# Patient Record
Sex: FEMALE | Race: WHITE | NOT HISPANIC OR LATINO | Employment: OTHER | URBAN - METROPOLITAN AREA
[De-identification: names, ages, dates, MRNs, and addresses within clinical notes are randomized per-mention and may not be internally consistent; named-entity substitution may affect disease eponyms.]

---

## 2017-01-03 ENCOUNTER — OFFICE VISIT (OUTPATIENT)
Dept: OTOLARYNGOLOGY | Facility: CLINIC | Age: 77
End: 2017-01-03
Payer: MEDICARE

## 2017-01-03 VITALS
BODY MASS INDEX: 22.55 KG/M2 | WEIGHT: 114.88 LBS | SYSTOLIC BLOOD PRESSURE: 153 MMHG | DIASTOLIC BLOOD PRESSURE: 78 MMHG | HEART RATE: 83 BPM | HEIGHT: 60 IN

## 2017-01-03 DIAGNOSIS — M26.609 TMJ (TEMPOROMANDIBULAR JOINT DISORDER): Primary | ICD-10-CM

## 2017-01-03 PROCEDURE — 99203 OFFICE O/P NEW LOW 30 MIN: CPT | Mod: S$GLB,,, | Performed by: OTOLARYNGOLOGY

## 2017-01-03 PROCEDURE — 99999 PR PBB SHADOW E&M-EST. PATIENT-LVL III: CPT | Mod: PBBFAC,,, | Performed by: OTOLARYNGOLOGY

## 2017-01-03 PROCEDURE — 3078F DIAST BP <80 MM HG: CPT | Mod: S$GLB,,, | Performed by: OTOLARYNGOLOGY

## 2017-01-03 PROCEDURE — 1125F AMNT PAIN NOTED PAIN PRSNT: CPT | Mod: S$GLB,,, | Performed by: OTOLARYNGOLOGY

## 2017-01-03 PROCEDURE — 1157F ADVNC CARE PLAN IN RCRD: CPT | Mod: S$GLB,,, | Performed by: OTOLARYNGOLOGY

## 2017-01-03 PROCEDURE — 3077F SYST BP >= 140 MM HG: CPT | Mod: S$GLB,,, | Performed by: OTOLARYNGOLOGY

## 2017-01-03 PROCEDURE — 1160F RVW MEDS BY RX/DR IN RCRD: CPT | Mod: S$GLB,,, | Performed by: OTOLARYNGOLOGY

## 2017-01-03 PROCEDURE — 1159F MED LIST DOCD IN RCRD: CPT | Mod: S$GLB,,, | Performed by: OTOLARYNGOLOGY

## 2017-01-03 NOTE — PROGRESS NOTES
Subjective:      Shira De Leon is a 76 y.o. female who presents as a referral for TMJ disorder evaluation. Patient has 2 year history of pain with chewing at angle of left mandible extending superiorly to TMJ. Pt reports no alleviating factors, but pain with eating is most common aggravator. Pt reports no history of recent dental work to left side of mouth, but does report 2 missing mandibular molars on left side. Pt has history significant for Bell's palsy on left side of face with residual mouth drooping on this side as well as h/o pituitary adenoma resection several years ago.      Past Medical History  She has a past medical history of Arthritis; GERD (gastroesophageal reflux disease); HTN (hypertension); Myelopathy of thoracic region; Osteoporosis, unspecified; Pituitary adenoma; and PVD (peripheral vascular disease).    Past Surgical History  She has a past surgical history that includes Cataract extraction; blephroplasty; yag; bladder lift; Total abdominal hysterectomy w/ bilateral salpingoophorectomy; pituitary adenoma resection (2013); Hysterectomy; and Colonoscopy (N/A, 9/15/2016).    Family History  Her family history includes No Known Problems in her brother, father, maternal aunt, maternal grandfather, maternal grandmother, maternal uncle, mother, paternal aunt, paternal grandfather, paternal grandmother, paternal uncle, and sister. There is no history of Amblyopia, Blindness, Hypertension, Macular degeneration, Retinal detachment, Strabismus, Stroke, Thyroid disease, Cancer, Cataracts, Diabetes, or Glaucoma.    Social History  She reports that she has never smoked. She does not have any smokeless tobacco history on file. She reports that she does not drink alcohol or use illicit drugs.    Allergies  She is allergic to penicillins.    Medications   She has a current medication list which includes the following prescription(s): amlodipine, calcium-vitamin d, hydrocodone-acetaminophen 10-325mg, and  pantoprazole.    Review of Systems  Review of Systems   Constitutional: Negative for chills and fever.   HENT: Positive for congestion, dental problem (Left TMJ pain) and tinnitus. Negative for hoarse voice and rhinorrhea.    Musculoskeletal: Positive for back pain.   Allergic/Immunologic: Positive for environmental allergies.   Neurological: Positive for headaches.          Objective:     Visit Vitals    BP (!) 153/78    Pulse 83    Ht 5' (1.524 m)    Wt 52.1 kg (114 lb 13.8 oz)    BMI 22.43 kg/m2          Constitutional:   She appears well-developed. She is cooperative. Normal speech.  No hoarse voice.      Head:  Normocephalic. Salivary glands normal.  Facial strength is normal.      Ears:    Right Ear: No drainage or tenderness. Tympanic membrane is not perforated. Tympanic membrane mobility is normal. No middle ear effusion. No decreased hearing is noted.   Left Ear: No drainage or tenderness. Tympanic membrane is not perforated. Tympanic membrane mobility is normal.  No middle ear effusion. No decreased hearing is noted.     Nose:  No mucosal edema, rhinorrhea, septal deviation or polyps. No epistaxis. Turbinates normal, no turbinate masses and no turbinate hypertrophy.  Right sinus exhibits no maxillary sinus tenderness and no frontal sinus tenderness. Left sinus exhibits no maxillary sinus tenderness and no frontal sinus tenderness.   Small right nostril notch.    Mouth/Throat  Oropharynx clear and moist without lesions or asymmetry and normal uvula midline. She does not have dentures. Normal dentition. No oral lesions or mucous membrane lesions. No oropharyngeal exudate or posterior oropharyngeal erythema. Mirror exam not performed due to patient tolerance.  Mirror exam not performed due to patient tolerance.    Healed sublabial incision.      Neck:  Neck normal without thyromegaly masses, asymmetry, normal tracheal structure, crepitus, and tenderness, thyroid normal, trachea normal and no adenopathy.  Normal range of motion present.     She has no cervical adenopathy.     Cardiovascular:   Regular rhythm.      Pulmonary/Chest:   Effort normal.     Psychiatric:   She has a normal mood and affect. Her speech is normal and behavior is normal.     Neurological:   No cranial nerve deficit.     Skin:   No rash noted.     Physical Exam    Vitals:    01/03/17 1444   BP: (!) 153/78   Pulse: 83     Body mass index is 22.43 kg/(m^2).    General: AOx3, NAD  Right Ear:  Canal WNL,TM w/o masses/lesions/perforations  Left Ear:   Canal WNL,TM w/o masses/lesions/perforations  Nose: No gross nasal septal deviation.  Synechiae present midway up nasal septum on right side. Inferior Turbinates WNL bilaterally.  No septal perforation.  No masses/lesions.  Oral Cavity: FOM Soft, no masses palpated.  Oral Tongue mobile.  Hard Palate WNL.  Oropharynx: BOT WNL.  No masses/lesions noted.  Tonsillar fossa without lesions.  Soft palate without masses.  Midline uvula.  Face: Corner of mouth on left side does not elevated (h/o of Bell's palsy). CN II-XII otherwise grossly intact. Pain upon palpation of left TMJ when bilateral TMJ's palpated and pt asked to open jaw  Eyes: Normal extra ocular motion bilaterally.    Data Reviewed    WBC (K/uL)   Date Value   09/21/2016 9.12     Eosinophil% (%)   Date Value   09/21/2016 1.9     Eos # (K/uL)   Date Value   09/21/2016 0.2     Platelets (K/uL)   Date Value   09/21/2016 428 (H)     Glucose (mg/dL)   Date Value   09/21/2016 94     No results found for: IGE       Assessment:     1. TMJ (temporomandibular joint disorder)         Plan:     I had a long discussion with the patient and her  regarding her condition and the further workup and management options.    - NSAID's for pain and inflammation relief  - Ice compress to left TMJ when symptomatic  - Jaw rest as possible with soft diet  - Urged discussion with dentist at next visit    Return if symptoms worsen or  fail to improve.

## 2017-01-03 NOTE — Clinical Note
January 3, 2017      Harry Holder MD  [address]  [address]  [facility] [address]           [facility] - Otorhinolaryngology  [address]  [place] [address]  Phone: [phone]  Fax: [phone]          Patient: Shira De Leon   MR Number: [id number]   YOB: 1940   Date of Visit: 1/3/2017       Dear Dr. Harry Holder:    Thank you for referring Shira De Leon to me for evaluation. Attached you will find relevant portions of my assessment and plan of care.    If you have questions, please do not hesitate to call me. I look forward to following Shira De Leon along with you.    Sincerely,    Donal Goodwin MD    Enclosure  CC:  No Recipients    If you would like to receive this communication electronically, please contact [email or web address] or [phone] to request more information on [employer] access.    For providers and/or their staff who would like to refer a patient to [facility], please contact us through our one-stop-shop provider referral line, [facility], at [phone].    If you feel you have received this communication in error or would no longer like to receive these types of communications, please e-mail [email or web address]

## 2017-01-03 NOTE — LETTER
January 3, 2017    Harry Holder MD  [address]  [address]  [place] [address]           OTOLARYNGOLOGY AND COMMUNICATION SCIENCES    Hansel Williamson MD, FACS          SURGICAL AND MEDICAL DISEASES OF HEAD AND NECK  MD Hansel Esparza MD, FACS  Armen Garcia III, MD    OTOLOGY, NEUROTOLOGY and SKULL-BASE SURGERY  Ja Wong MD, FACS  Dmitriy Odom MD, Director    PEDIATRIC OTOLARYNGOLOGY & OTOLOGY  YADI Cook MD, FAAP  Joe Pascal MD, FACS    FACIAL PLASTIC and RECONSTRUCTIVE SURGERY  GARY Wing III, MD, FACS    RHINOLOGY and SINUS SURGERY  Donal Goodwin MD, MPH, FACS  Director   GARY Wing III, MD, FACS    LARYNGOLOGY  Russ Meyer MD    SPEECH-LANGUAGE PATHOLOGY  Amy Brown, PhD, [facility]-SLP  Quita Segal, MS, CCC-SLP  Nickie Valiente, MS, CCC-SLP  Malika Lewis MA, [facility]-SLP    AUDIOLOGY SECTION  Anika Mcrae, MS, CCC-A  REEMA Pedraza, CCC-A  Ivan Hale, CCC-A  Ivan Hahn, CCC-A  Ajay Cruz Jr., REEMA, CCA-A  REEMA Wills, CCC-A  Ivan Taylor, CCC-A    [facility]ED [facility]ACT[facility] CLINIC[facility]  Head and Neck Surgical Oncology  MP Kaufman, FNP-C  Pedatric Otolaryngology  MP Burk, PNP-C       Re:  Shira De Leon  :  1940    Dear Dr. Holder,    Thank you for referring your patient, Shira De Leon, to us for evaluation and treatment.  I have enclosed a copy of my clinic note for your review and records.  If you have any questions please do not hesitate to contact our office.     Thank you for allowing me to participate in the care of your patient.    Sincerely,        Donal Goodwin MD, MPH, FACS    Director, Rhinology and Sinus Surgery  Department of Otorhinolaryngology  [facility]    Encl:  Progress note       [facility] [address] [place] [address]  phone [phone]  fax [phone]  [email or web address]

## 2017-03-29 ENCOUNTER — TELEPHONE (OUTPATIENT)
Dept: FAMILY MEDICINE | Facility: CLINIC | Age: 77
End: 2017-03-29

## 2017-03-29 NOTE — TELEPHONE ENCOUNTER
----- Message from Mala Frederick sent at 3/29/2017  8:16 AM CDT -----  Contact: [phone]  Pt spouse would like the doctor to call him back. Please advise.

## 2017-03-30 ENCOUNTER — TELEPHONE (OUTPATIENT)
Dept: FAMILY MEDICINE | Facility: CLINIC | Age: 77
End: 2017-03-30

## 2017-03-30 DIAGNOSIS — M79.2 NEUROPATHIC PAIN: ICD-10-CM

## 2017-03-30 DIAGNOSIS — M25.561 CHRONIC PAIN OF BOTH KNEES: Primary | ICD-10-CM

## 2017-03-30 DIAGNOSIS — G89.29 CHRONIC PAIN OF BOTH KNEES: Primary | ICD-10-CM

## 2017-03-30 DIAGNOSIS — M25.562 CHRONIC PAIN OF BOTH KNEES: Primary | ICD-10-CM

## 2017-03-30 DIAGNOSIS — M51.36 LUMBAR DEGENERATIVE DISC DISEASE: ICD-10-CM

## 2017-03-30 DIAGNOSIS — M54.16 LUMBAR RADICULOPATHY: ICD-10-CM

## 2017-03-30 RX ORDER — GABAPENTIN 100 MG/1
CAPSULE ORAL
Qty: 120 CAPSULE | Refills: 4 | Status: SHIPPED | OUTPATIENT
Start: 2017-03-30 | End: 2017-03-30 | Stop reason: SDUPTHER

## 2017-03-30 NOTE — TELEPHONE ENCOUNTER
----- Message from Leslie Lamb LPN sent at 3/30/2017  4:53 PM CDT -----  Contact: [phone]  Patient is having pain in knees and back.  Says he feels that her skin is 'burning'.  Wanted to know if you had a doctor you would recommend for her see?  ----- Message -----     From: Aneudy Franco LPN     Sent: 3/29/2017  10:43 AM       To: Leslie Lamb LPN        ----- Message -----     From: Mala Frederick     Sent: 3/29/2017   8:16 AM       To: [facility] Staff    Pt spouse would like the doctor to call him back. Please advise.    Please see message sent earlier for her to have bilateral knee x-rays, arrange for a lumbar MRI scan, and to begin gabapentin 100 mg in the morning, 100 mg in the afternoon, and 200 mg at bedtime for neuropathic pain.  I will also place a referral for her to be evaluated by interventional pain management, Dr.Reda Turpin.  Please arrange for the referral after the imaging test are done.  Thanks
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
## 2017-03-31 RX ORDER — GABAPENTIN 100 MG/1
CAPSULE ORAL
Qty: 360 CAPSULE | Refills: 4 | Status: SHIPPED | OUTPATIENT
Start: 2017-03-31 | End: 2017-04-04

## 2017-04-03 ENCOUNTER — TELEPHONE (OUTPATIENT)
Dept: FAMILY MEDICINE | Facility: CLINIC | Age: 77
End: 2017-04-03

## 2017-04-04 ENCOUNTER — OFFICE VISIT (OUTPATIENT)
Dept: FAMILY MEDICINE | Facility: CLINIC | Age: 77
End: 2017-04-04
Payer: MEDICARE

## 2017-04-04 VITALS
WEIGHT: 115.75 LBS | DIASTOLIC BLOOD PRESSURE: 82 MMHG | HEIGHT: 61 IN | HEART RATE: 78 BPM | BODY MASS INDEX: 21.85 KG/M2 | OXYGEN SATURATION: 98 % | SYSTOLIC BLOOD PRESSURE: 150 MMHG

## 2017-04-04 DIAGNOSIS — M17.0 BILATERAL PRIMARY OSTEOARTHRITIS OF KNEE: ICD-10-CM

## 2017-04-04 DIAGNOSIS — M51.9 LUMBAR DISC DISEASE: Primary | ICD-10-CM

## 2017-04-04 DIAGNOSIS — I10 ESSENTIAL HYPERTENSION: Chronic | ICD-10-CM

## 2017-04-04 PROCEDURE — 1157F ADVNC CARE PLAN IN RCRD: CPT | Mod: S$GLB,,, | Performed by: FAMILY MEDICINE

## 2017-04-04 PROCEDURE — 1125F AMNT PAIN NOTED PAIN PRSNT: CPT | Mod: S$GLB,,, | Performed by: FAMILY MEDICINE

## 2017-04-04 PROCEDURE — 99999 PR PBB SHADOW E&M-EST. PATIENT-LVL III: CPT | Mod: PBBFAC,,, | Performed by: FAMILY MEDICINE

## 2017-04-04 PROCEDURE — 1160F RVW MEDS BY RX/DR IN RCRD: CPT | Mod: S$GLB,,, | Performed by: FAMILY MEDICINE

## 2017-04-04 PROCEDURE — 3079F DIAST BP 80-89 MM HG: CPT | Mod: S$GLB,,, | Performed by: FAMILY MEDICINE

## 2017-04-04 PROCEDURE — 99214 OFFICE O/P EST MOD 30 MIN: CPT | Mod: S$GLB,,, | Performed by: FAMILY MEDICINE

## 2017-04-04 PROCEDURE — 3077F SYST BP >= 140 MM HG: CPT | Mod: S$GLB,,, | Performed by: FAMILY MEDICINE

## 2017-04-04 PROCEDURE — 1159F MED LIST DOCD IN RCRD: CPT | Mod: S$GLB,,, | Performed by: FAMILY MEDICINE

## 2017-04-04 RX ORDER — MELOXICAM 7.5 MG/1
7.5 TABLET ORAL DAILY
Qty: 30 TABLET | Refills: 0 | Status: SHIPPED | OUTPATIENT
Start: 2017-04-04 | End: 2017-04-04 | Stop reason: CLARIF

## 2017-04-04 NOTE — MR AVS SNAPSHOT
[address]  [place] [address]  Phone: [phone]  Fax: [phone]                  Shira De Leon   2017 11:20 AM   Office Visit    Descripción:  Female : 1940   Personal Médico:  Venkat Epstein MD   Departamento:  [facility]           Razón de la nohelia     Back Pain     Knee Pain           Diagnósticos de Esta Visita        Comentarios    Lumbar disc disease    -  Primario     Essential hypertension         Bilateral primary osteoarthritis of knee                Lista de tareas           Metas (5 Years of Data)     Ninguna      Follow-Up and Disposition     Return in about 4 weeks (around 2017), or if symptoms worsen or fail to improve.      Recetas para recoger        Disp Refills Start End    meloxicam (MOBIC) 7.5 MG tablet 30 tablet 0 2017     Take 1 tablet (7.5 mg total) by mouth once daily. - Oral    [facility]: [facility] - [address] [place]. de tlfo: #: [phone]         [facility] en [facility]lamada     [facility] En Llamada Línea de Enfermeras - Asistencia   Enfermeras registradas de [facility] pueden ayudarle a reservar barb nohelia, proveer educación para la jose l, asesoría clínica, y otros servicios de asesoramiento.   Llame para carlton servicio gratuito a [phone].             Medicamentos           Mensaje sobre Medicamentos     Verificar los cambios y / o adiciones a dalal régimen de medicación son los mismos que discutir con dalal médico. Si cualquiera de estos cambios o adiciones son incorrectos, por favor notifique a dalal proveedor de atención médica.        EMPEZAR a karen estos medicamentos NUEVOS        Refills    meloxicam (MOBIC) 7.5 MG tablet 0    Sig: Take 1 tablet (7.5 mg total) by mouth once daily.    Categoría: Normal    Vía: Oral      DEJAR de karen estos medicamentos     gabapentin (NEURONTIN) 100 MG capsule TAKE 1 CAPSULE BY MOUTH EVERY MORNING, 1 CAPSULE IN THE AFTERNOON AND 2  "CAPSULES EVERY NIGHT AT BEDTIME FOR BURNING PAIN    brompheniramine-pseudoeph-DM 2-30-10 mg/5 mL Syrp TK 1 TEA PO Q 6 H PRF CNC    hydrocodone-acetaminophen 10-325mg (NORCO)  mg Tab 1 tablet daily as needed.            Verifique que la siguiente lista de medicamentos es barb representación exacta de los medicamentos que está tomando actualmente. Si no hay ningunos reportados, la lista puede estar en crowley. Si no es correcta, por favor póngase en contacto con dalal proveedor de atención médica. Lleve esta lista con usted en alysha de emergencia.           Medicamentos Actuales     amlodipine (NORVASC) 5 MG tablet TAKE 1 TABLET EVERY DAY    calcium-vitamin D 500-125 mg-unit tablet Take 1 tablet by mouth 2 (two) times daily.    ciprofloxacin HCl (CIPRO) 500 MG tablet TK 1 T PO  BID    pantoprazole (PROTONIX) 40 MG tablet TAKE 1 TABLET(40 MG) BY MOUTH EVERY DAY    meloxicam (MOBIC) 7.5 MG tablet Take 1 tablet (7.5 mg total) by mouth once daily.           Información de referencia clínica           Chiqui signos vitales aleksandra     PS Pulso [place] Peso SpO2 BMI (IMC)    150/82 78 5' 1" (1.549 m) 52.5 kg (115 lb 11.9 oz) 98% 21.87 kg/m2      Blood Pressure          Most Recent Value    BP  (!)  150/82      Alergias     A partir del:  4/4/2017        Penicillins      Vacunas     Administradas en la fecha de la visita:  4/4/2017        None      Registrarse para [facility]     La activación de dalal cuenta [facility] es tan fácil dayna 1-2-3!    1) Ir a [email or web address], seleccione Registrarse Ahora, meter el código de activación y dalal fecha de nacimiento, y seleccione Próximo.    [id number]  Expires: 5/19/2017 11:42 AM      2) Crear un nombre de usuario y contraseña para usar cuando se visita [facility] en el futuro y selecciona barb pregunta de seguridad en alysha de que pierda dalal contraseña y seleccione Próximo.    3) Introduzca dalal dirección de correo electrónico y jenn[facility] en Registrarse!    Información Adicional  Si tiene alguna " pregunta, por favor, e-mail [email or web address] o lldante al [phone] para hablar con nuestro personal. Recuerde, [facility] no debe ser usada para necesidades urgentes. En alysha de emergencia médica, satya al 911.        Language Assistance Services     ATTENTION: Language assistance services are available, free of charge. Please call [phone].      ATENCIÓN: Si habla español, tiene a dalal disposición servicios gratuitos de asistencia lingüística. Llame al [phone].     CHÚ Ý: N?u b?n nói Ti?ng Vi?t, có các d?ch v? h? tr? ngôn ng? mi?n phí dành cho b?n. G?i s? [phone].         [facility] Medicine cumple con las leyes federales aplicables de derechos civiles y no discrimina por motivos de alfredo, color, origen nacional, edad, discapacidad, o sexo.                 Shira De Leon   2017 11:20 AM   Office Visit    Description:  Female : 1940   Provider:  Venkat Epstein MD   Department:  [facility] Medicine           Reason for Visit     Back Pain     Knee Pain           Diagnoses this Visit        Comments    Lumbar disc disease    -  Primary     Essential hypertension         Bilateral primary osteoarthritis of knee                To Do List           Goals     None      Follow-Up and Disposition     Return in about 4 weeks (around 2017), or if symptoms worsen or fail to improve.       These Medications        Disp Refills Start End    meloxicam (MOBIC) 7.5 MG tablet 30 tablet 0 2017     Take 1 tablet (7.5 mg total) by mouth once daily. - Oral    Pharmacy: [facility] - [place] [id number] [facility] Ph #: [phone]         [facility]     [facility] Nurse Care Line -  Assistance  Unless otherwise directed by your provider, please contact [facility], our nurse care line that is available for  assistance.     Registered nurses in the [facility] provide: appointment scheduling, clinical advisement, health  "education, and other advisory services.  Call: [phone] (toll free)               Medications           Message regarding Medications     Verify the changes and/or additions to your medication regime listed below are the same as discussed with your clinician today.  If any of these changes or additions are incorrect, please notify your healthcare provider.        START taking these NEW medications        Refills    meloxicam (MOBIC) 7.5 MG tablet 0    Sig: Take 1 tablet (7.5 mg total) by mouth once daily.    Class: Normal    Route: Oral      STOP taking these medications     gabapentin (NEURONTIN) 100 MG capsule TAKE 1 CAPSULE BY MOUTH EVERY MORNING, 1 CAPSULE IN THE AFTERNOON AND 2 CAPSULES EVERY NIGHT AT BEDTIME FOR BURNING PAIN    brompheniramine-pseudoeph-DM 2-30-10 mg/5 mL Syrp TK 1 TEA PO Q 6 H PRF CNC    hydrocodone-acetaminophen 10-325mg (NORCO)  mg Tab 1 tablet daily as needed.            Verify that the below list of medications is an accurate representation of the medications you are currently taking.  If none reported, the list may be blank. If incorrect, please contact your healthcare provider. Carry this list with you in case of emergency.           Current Medications     amlodipine (NORVASC) 5 MG tablet TAKE 1 TABLET EVERY DAY    calcium-vitamin D 500-125 mg-unit tablet Take 1 tablet by mouth 2 (two) times daily.    ciprofloxacin HCl (CIPRO) 500 MG tablet TK 1 T PO  BID    pantoprazole (PROTONIX) 40 MG tablet TAKE 1 TABLET(40 MG) BY MOUTH EVERY DAY    meloxicam (MOBIC) 7.5 MG tablet Take 1 tablet (7.5 mg total) by mouth once daily.           Clinical Reference Information           Your Vitals Were     BP Pulse Height Weight SpO2 BMI    150/82 78 5' 1" (1.549 m) 52.5 kg (115 lb 11.9 oz) 98% 21.87 kg/m2      Blood Pressure          Most Recent Value    BP  (!)  150/82      Allergies as of 4/4/2017     Penicillins      Immunizations Administered on Date of Encounter - 4/4/2017     None    "   [facility] Sign-Up     Activating your [facility] account is as easy as 1-2-3!     1) Visit [email or web address], select Sign Up Now, enter this activation code and your date of birth, then select Next.  [email or web address]  Expires: 5/19/2017 11:42 AM      2) Create a username and password to use when you visit [facility] in the future and select a security question in case you lose your password and select Next.    3) Enter your e-mail address and click Sign Up!    Additional Information  If you have questions, please e-mail [email or web address] or call [phone] to talk to our [facility] staff. Remember, [facility] is NOT to be used for urgent needs. For medical emergencies, dial 911.         Language Assistance Services     ATTENTION: Language assistance services are available, free of charge. Please call [phone].      ATENCIÓN: Si habla ethan, tiene a dalal disposición servicios gratuitos de asistencia lingüística. Llame al [phone].     YI Ý: N?u b?n nói Ti?ng Vi?t, có các d?ch v? h? tr? ngôn ng? mi?n phí dành cho b?n. G?i s? [phone].         [facility] complies with applicable Federal civil rights laws and does not discriminate on the basis of race, color, national origin, age, disability, or sex.

## 2017-04-04 NOTE — PROGRESS NOTES
Subjective:       Patient ID: Shira De Leon is a 77 y.o. female.    Chief Complaint: Back Pain (mostly at night) and Knee Pain (mostly at night)    HPI Comments: 77 yr old pleasant female with hypertension, GERD, pituitary tumor, OA knee, DJD L spine, presents today for an evaluation of knee and low back pain.l Onset years ago and intermittent since then. She denies any neurological symptoms or radiation of pain. She also denies any impairment in walking or with ADL or IADL. She has used some advil and it helps. She has not used any heat or ice. Details as follows -      HTN - elevated today - she has not taken med yet       History as below - reviewed     Back Pain   This is a chronic problem. The current episode started more than 1 year ago. The problem occurs intermittently. The problem is unchanged. The pain is present in the lumbar spine. The quality of the pain is described as aching. The pain does not radiate. The pain is at a severity of 4/10. The pain is mild. The symptoms are aggravated by bending and twisting. Pertinent negatives include no abdominal pain, bladder incontinence, bowel incontinence, chest pain, dysuria, fever, headaches, numbness, paresthesias, pelvic pain, tingling or weight loss. The treatment provided no relief.   Knee Pain    There was no injury mechanism. The pain is present in the right knee and left knee. The quality of the pain is described as aching. The pain is at a severity of 4/10. The pain is mild. The pain has been constant since onset. Pertinent negatives include no loss of motion, loss of sensation, muscle weakness, numbness or tingling. The symptoms are aggravated by movement and palpation. She has tried nothing for the symptoms. The treatment provided no relief.     Review of Systems   Constitutional: Negative.  Negative for activity change, diaphoresis, fever, unexpected weight change and weight loss.   HENT: Negative.  Negative for congestion, ear discharge, hearing loss,  rhinorrhea, sore throat and voice change.    Eyes: Negative.  Negative for pain, discharge and visual disturbance.   Respiratory: Negative.  Negative for chest tightness, shortness of breath and wheezing.    Cardiovascular: Negative.  Negative for chest pain.   Gastrointestinal: Negative.  Negative for abdominal distention, abdominal pain, anal bleeding, bowel incontinence, constipation and nausea.   Endocrine: Negative.  Negative for cold intolerance, polydipsia and polyuria.   Genitourinary: Negative.  Negative for bladder incontinence, decreased urine volume, difficulty urinating, dysuria, frequency, menstrual problem, pelvic pain and vaginal pain.   Musculoskeletal: Positive for arthralgias, back pain and myalgias. Negative for gait problem.   Skin: Negative.  Negative for color change, pallor and wound.   Allergic/Immunologic: Negative.  Negative for environmental allergies and immunocompromised state.   Neurological: Negative.  Negative for dizziness, tingling, tremors, seizures, speech difficulty, numbness, headaches and paresthesias.   Hematological: Negative.  Negative for adenopathy. Does not bruise/bleed easily.   Psychiatric/Behavioral: Negative.  Negative for agitation, confusion, decreased concentration, hallucinations, self-injury and suicidal ideas. The patient is not nervous/anxious.        PMH/PSH/FH/SH/MED/ALLERGY reviewed    Objective:       Vitals:    04/04/17 1125   BP: (!) 150/82   Pulse: 78       Physical Exam   Constitutional: She is oriented to person, place, and time. She appears well-developed and well-nourished. No distress.   HENT:   Head: Normocephalic and atraumatic.   Right Ear: External ear normal.   Left Ear: External ear normal.   Nose: Nose normal.   Mouth/Throat: Oropharynx is clear and moist. No oropharyngeal exudate.   Eyes: Conjunctivae and EOM are normal. Pupils are equal, round, and reactive to light. Right eye exhibits no discharge. Left eye exhibits no discharge. No  scleral icterus.   Neck: Normal range of motion. Neck supple. No JVD present. No tracheal deviation present. No thyromegaly present.   Cardiovascular: Normal rate, regular rhythm, normal heart sounds and intact distal pulses.  Exam reveals no gallop and no friction rub.    No murmur heard.  Pulmonary/Chest: Effort normal and breath sounds normal. No stridor. She has no wheezes. She has no rales. She exhibits no tenderness.   Abdominal: Soft. Bowel sounds are normal. She exhibits no distension and no mass. There is no tenderness. There is no rebound and no guarding. No hernia.   Musculoskeletal: Normal range of motion. She exhibits tenderness (mild TTP medial and lateral joint line B/L knee. No effusion and no ligament strain.). She exhibits no edema.   Mild TTP L3-L5 and SLRT negative B/L   Lymphadenopathy:     She has no cervical adenopathy.   Neurological: She is alert and oriented to person, place, and time. She has normal reflexes. She displays normal reflexes. No cranial nerve deficit. She exhibits normal muscle tone. Coordination normal.   Skin: Skin is warm and dry. No rash noted. She is not diaphoretic. No erythema. No pallor.   Psychiatric: She has a normal mood and affect. Her behavior is normal. Judgment and thought content normal.       Assessment:       1. Lumbar disc disease    2. Essential hypertension    3. Bilateral primary osteoarthritis of knee        Plan:       Shira was seen today for back pain and knee pain.    Diagnoses and all orders for this visit:    Lumbar disc disease  -     Discontinue: meloxicam (MOBIC) 7.5 MG tablet; Take 1 tablet (7.5 mg total) by mouth once daily.    Essential hypertension    Bilateral primary osteoarthritis of knee  -     Discontinue: meloxicam (MOBIC) 7.5 MG tablet; Take 1 tablet (7.5 mg total) by mouth once daily.      Low back and knee pain  -DJD/OA  -rest, heat or ice application  -aspercreme local application  -glucosamine and chondroitin sulfate  [facility]  -mobic prn    Spent adequate time in obtaining history and explaining differentials      40 minutes spent during this visit of which greater than 50% devoted to face-face counseling and coordination of care regarding diagnosis and management plan    Return in about 4 weeks (around 5/2/2017), or if symptoms worsen or fail to improve.

## 2017-05-02 ENCOUNTER — OFFICE VISIT (OUTPATIENT)
Dept: FAMILY MEDICINE | Facility: CLINIC | Age: 77
End: 2017-05-02
Payer: MEDICARE

## 2017-05-02 VITALS
WEIGHT: 116.38 LBS | DIASTOLIC BLOOD PRESSURE: 82 MMHG | OXYGEN SATURATION: 96 % | HEIGHT: 61 IN | BODY MASS INDEX: 21.97 KG/M2 | SYSTOLIC BLOOD PRESSURE: 150 MMHG | HEART RATE: 76 BPM

## 2017-05-02 DIAGNOSIS — M17.0 BILATERAL PRIMARY OSTEOARTHRITIS OF KNEE: ICD-10-CM

## 2017-05-02 DIAGNOSIS — M51.9 LUMBAR DISC DISEASE: ICD-10-CM

## 2017-05-02 DIAGNOSIS — D35.2 PITUITARY ADENOMA: ICD-10-CM

## 2017-05-02 DIAGNOSIS — I10 ESSENTIAL HYPERTENSION: Primary | Chronic | ICD-10-CM

## 2017-05-02 DIAGNOSIS — M81.0 OSTEOPOROSIS, UNSPECIFIED OSTEOPOROSIS TYPE, UNSPECIFIED PATHOLOGICAL FRACTURE PRESENCE: ICD-10-CM

## 2017-05-02 DIAGNOSIS — I73.9 PVD (PERIPHERAL VASCULAR DISEASE): ICD-10-CM

## 2017-05-02 PROCEDURE — 1126F AMNT PAIN NOTED NONE PRSNT: CPT | Mod: S$GLB,,, | Performed by: FAMILY MEDICINE

## 2017-05-02 PROCEDURE — 99499 UNLISTED E&M SERVICE: CPT | Mod: S$GLB,,, | Performed by: FAMILY MEDICINE

## 2017-05-02 PROCEDURE — 3079F DIAST BP 80-89 MM HG: CPT | Mod: S$GLB,,, | Performed by: FAMILY MEDICINE

## 2017-05-02 PROCEDURE — 99999 PR PBB SHADOW E&M-EST. PATIENT-LVL III: CPT | Mod: PBBFAC,,, | Performed by: FAMILY MEDICINE

## 2017-05-02 PROCEDURE — 1157F ADVNC CARE PLAN IN RCRD: CPT | Mod: S$GLB,,, | Performed by: FAMILY MEDICINE

## 2017-05-02 PROCEDURE — 3077F SYST BP >= 140 MM HG: CPT | Mod: S$GLB,,, | Performed by: FAMILY MEDICINE

## 2017-05-02 PROCEDURE — 99214 OFFICE O/P EST MOD 30 MIN: CPT | Mod: S$GLB,,, | Performed by: FAMILY MEDICINE

## 2017-05-02 PROCEDURE — 1159F MED LIST DOCD IN RCRD: CPT | Mod: S$GLB,,, | Performed by: FAMILY MEDICINE

## 2017-05-02 PROCEDURE — 1160F RVW MEDS BY RX/DR IN RCRD: CPT | Mod: S$GLB,,, | Performed by: FAMILY MEDICINE

## 2017-05-02 RX ORDER — AMLODIPINE BESYLATE 10 MG/1
10 TABLET ORAL DAILY
Qty: 90 TABLET | Refills: 3 | Status: SHIPPED | OUTPATIENT
Start: 2017-05-02 | End: 2017-08-22

## 2017-05-02 NOTE — MR AVS SNAPSHOT
[address]  [place] [address]  Phone: [phone]  Fax: [phone]                  Shira De Leon   2017 10:00 AM   Office Visit    Descripción:  Female : 1940   Personal Médico:  Venkat Epstein MD   Departamento:  [facility]           Razón de la nohelia     Hypertension           Diagnósticos de Esta Visita        Comentarios    Essential hypertension    -  Primario     Bilateral primary osteoarthritis of knee         Osteoporosis, unspecified osteoporosis type, unspecified pathological fracture presence         Lumbar disc disease         PVD (peripheral vascular disease)         Pituitary adenoma                Lista de tareas           Citas próximas        Personal Médico Departamento Tfno del dpto    2017 9:00 AM [facility] XR1 [facility] [phone]    2017 9:30 AM [facility] MRI1 [facility] [phone]    2017 10:00 AM Do Turpin MD [facility] [phone]      Metas (5 Years of Data)     Ninguna      Follow-Up and Disposition     Return in about 4 months (around 2017), or if symptoms worsen or fail to improve.      Recetas para recoger        Disp Refills Start End    amlodipine (NORVASC) 10 MG tablet 90 tablet 3 2017     Take 1 tablet (10 mg total) by mouth once daily. - Oral    [facility]: [place] [address] [address] AT [facility] [address] [place] [address] de tlfo: #: [phone]         [facility]ada     [facility] En Llamada Línea de Enfermeras - Asistencia   Enfermeras registradas de [facility] pueden ayudarle a reservar barb nohelia, proveer educación para la jose l, asesoría clínica, y otros servicios de asesoramiento.   Llame para carlton servicio gratuito a [phone].             Medicamentos           Mensaje sobre Medicamentos     Verificar los cambios y / o adiciones a dalal régimen de medicación son los mismos que discutir con dalal  "médico. Si cualquiera de estos cambios o adiciones son incorrectos, por favor notifique a dalal proveedor de atención médica.        CAMBIAR la forma en que está tomando estos medicamentos     Start Taking Instead of    amlodipine (NORVASC) 10 MG tablet amlodipine (NORVASC) 5 MG tablet    Dosage:  Take 1 tablet (10 mg total) by mouth once daily. Dosage:  TAKE 1 TABLET EVERY DAY    Reason for Change:  Reorder            Verifique que la siguiente lista de medicamentos es barb representación exacta de los medicamentos que está tomando actualmente. Si no hay ningunos reportados, la lista puede estar en crowley. Si no es correcta, por favor póngase en contacto con dalal proveedor de atención médica. Lleve esta lista con usted en alysha de emergencia.           Medicamentos Actuales     amlodipine (NORVASC) 10 MG tablet Take 1 tablet (10 mg total) by mouth once daily.    calcium-vitamin D 500-125 mg-unit tablet Take 1 tablet by mouth 2 (two) times daily.    pantoprazole (PROTONIX) 40 MG tablet TAKE 1 TABLET(40 MG) BY MOUTH EVERY DAY    ciprofloxacin HCl (CIPRO) 500 MG tablet TK 1 T PO  BID           Información de referencia clínica           Chiqui signos vitales aleksandra     PS [place] SpO2 BMI (IMC)    156/82 (BP Location: Left arm, Patient Position: Sitting, BP Method: Automatic) 76 5' 1" (1.549 m) 52.8 kg (116 lb 6.5 oz) 96% 21.99 kg/m2      Blood Pressure          Most Recent Value    BP  (!)  156/82      Alergias     A partir del:  5/2/2017        Penicillins      Vacunas     Administradas en la fecha de la visita:  5/2/2017        None      Orders Placed During Today's Visit     Exámenes/Procedimientos futuros Se espera el Vence    DXA Bone Density Spine And Hip  5/2/2017 5/2/2018      Registrarse para [facility]     La activación de dalal cuenta [facility] es tan fácil dayna 1-2-3!    1) Ir a [email or web address], seleccione Registrarse Ahora, meter el código de activación y dalal fecha de nacimiento, y seleccione " Próximo.    [id number]  Expires: 2017 11:42 AM      2) Crear un nombre de usuario y contraseña para usar cuando se visita Jocelinepablito en el futuro y selecciona barb pregunta de seguridad en alysha de que pierda dalal contraseña y seleccione Próximo.    3) Introduzca dalal dirección de correo electrónico y jennifer nishant en Registrarse!    Información Adicional  Si tiene alguna pregunta, por favor, e-mail [email or web address] o llame al [phone] para hablar con nuestro personal. Recuerde, [facility] no debe ser usada para necesidades urgentes. En alysha de emergencia médica, llame al 911.        Language Assistance Services     ATTENTION: Language assistance services are available, free of charge. Please call [phone].      ATENCIÓN: Si habla español, tiene a dalal disposición servicios gratuitos de asistencia lingüística. Llame al [phone].     CHÚ Ý: N?u b?n nói Ti?ng Vi?t, có các d?ch v? h? tr? ngôn ng? mi?n phí dành cho b?n. G?i s? [phone].         [facility] cumple con las leyes federales aplicables de derechos civiles y no discrimina por motivos de alfredo, color, origen nacional, edad, discapacidad, o sexo.                 Shira De Leon   2017 10:00 AM   Office Visit    Description:  Female : 1940   Provider:  Venkat Epstein MD   Department:  [facility]           Reason for Visit     Hypertension           Diagnoses this Visit        Comments    Essential hypertension    -  Primary     Bilateral primary osteoarthritis of knee         Osteoporosis, unspecified osteoporosis type, unspecified pathological fracture presence         Lumbar disc disease         PVD (peripheral vascular disease)         Pituitary adenoma                To Do List           Future Appointments        Provider Department Dept Phone    2017 9:00 AM [facility] XR1 [facility] [phone]    2017 9:30 AM [facility] MRI1 [facility] [phone]    2017  10:00 AM Do Turpin MD [place] [facility] [phone]      Goals     None      Follow-Up and Disposition     Return in about 4 months (around 9/2/2017), or if symptoms worsen or fail to improve.       These Medications        Disp Refills Start End    amlodipine (NORVASC) 10 MG tablet 90 tablet 3 5/2/2017     Take 1 tablet (10 mg total) by mouth once daily. - Oral    Pharmacy: [facility] [address] [place] [address] [address] [place] [address] [address] AT [facility] [address] [place]  #: [phone]         [facility] On Call     [facility] On Call Nurse Care Line - 24/7 Assistance  Unless otherwise directed by your provider, please contact [facility], our nurse care line that is available for 24/7 assistance.     Registered nurses in the [facility] provide: appointment scheduling, clinical advisement, health education, and other advisory services.  Call: [phone] (toll free)               Medications           Message regarding Medications     Verify the changes and/or additions to your medication regime listed below are the same as discussed with your clinician today.  If any of these changes or additions are incorrect, please notify your healthcare provider.        CHANGE how you are taking these medications     Start Taking Instead of    amlodipine (NORVASC) 10 MG tablet amlodipine (NORVASC) 5 MG tablet    Dosage:  Take 1 tablet (10 mg total) by mouth once daily. Dosage:  TAKE 1 TABLET EVERY DAY    Reason for Change:  Reorder            Verify that the below list of medications is an accurate representation of the medications you are currently taking.  If none reported, the list may be blank. If incorrect, please contact your healthcare provider. Carry this list with you in case of emergency.           Current Medications     amlodipine (NORVASC) 10 MG tablet Take 1 tablet (10 mg total) by mouth once daily.    calcium-vitamin D 500-125 mg-unit tablet Take 1 tablet by mouth 2 (two) times daily.  "   pantoprazole (PROTONIX) 40 MG tablet TAKE 1 TABLET(40 MG) BY MOUTH EVERY DAY    ciprofloxacin HCl (CIPRO) 500 MG tablet TK 1 T PO  BID           Clinical Reference Information           Your Vitals Were     BP Pulse Height Weight SpO2 BMI    156/82 (BP Location: Left arm, Patient Position: Sitting, BP Method: Automatic) 76 5' 1" (1.549 m) 52.8 kg (116 lb 6.5 oz) 96% 21.99 kg/m2      Blood Pressure          Most Recent Value    BP  (!)  156/82      Allergies as of 5/2/2017     Penicillins      Immunizations Administered on Date of Encounter - 5/2/2017     None      Orders Placed During Today's Visit     Future Labs/Procedures Expected by Expires    DXA Bone Density Spine And Hip  5/2/2017 5/2/2018      [facility] Sign-Up     Activating your [facility] account is as easy as 1-2-3!     1) Visit [email or web address], select Sign Up Now, enter this activation code and your date of birth, then select Next.  [email or web address]  Expires: 5/19/2017 11:42 AM      2) Create a username and password to use when you visit [facility] in the future and select a security question in case you lose your password and select Next.    3) Enter your e-mail address and click Sign Up!    Additional Information  If you have questions, please e-mail [email or web address] or call [phone] to talk to our [facility] staff. Remember, [facility] is NOT to be used for urgent needs. For medical emergencies, dial 911.         Language Assistance Services     ATTENTION: Language assistance services are available, free of charge. Please call [phone].      ATENCIÓN: Si habla español, tiene a dalal disposición servicios gratuitos de asistencia lingüística. Llame al [phone].     [facility] Ý: N?u b?n nói Ti?ng Vi?t, có các d?ch v? h? tr? ngôn ng? mi?n phí dành cho b?n. G?i s? [phone].         [facility] complies with applicable Federal civil rights laws and does not discriminate on the basis of race, color, national origin, age, " disability, or sex.

## 2017-05-02 NOTE — PROGRESS NOTES
Subjective:       Patient ID: Shira De Leon is a 77 y.o. female.    Chief Complaint: Hypertension    HPI Comments: 77 yr old pleasant female with hypertension, GERD, pituitary tumor, OA knee, DJD L spine, presents today for an evaluation of HTN, knee and low back pain. Onset years ago and intermittent since then. She denies any neurological symptoms or radiation of pain. She also denies any impairment in walking or with ADL or IADL. She has used some advil and it helps. She has not used any heat or ice. She is scheduled to have x rays and MRI and seeing Dr. Turpin soon. Details as follows -      HTN - elevated today - she is on Norvasc 5 mg daily - has mild ankle edema intermittent     GERD - controlled - on PPI as needed    Pituitary tumor - stable - no symptoms    Osteoporosis - was on Reclast yearly - once - was following  and now ants to switch to [facility]      History as below - reviewed     Back Pain   This is a chronic problem. The current episode started more than 1 year ago. The problem occurs intermittently. The problem is unchanged. The pain is present in the lumbar spine. The quality of the pain is described as aching. The pain does not radiate. The pain is at a severity of 4/10. The pain is mild. The symptoms are aggravated by bending and twisting. Pertinent negatives include no abdominal pain, bladder incontinence, bowel incontinence, chest pain, dysuria, fever, headaches, numbness, paresthesias, pelvic pain, tingling or weight loss. The treatment provided no relief.   Knee Pain    There was no injury mechanism. The pain is present in the right knee and left knee. The quality of the pain is described as aching. The pain is at a severity of 4/10. The pain is mild. The pain has been constant since onset. Pertinent negatives include no loss of motion, loss of sensation, muscle weakness, numbness or tingling. The symptoms are aggravated by movement and palpation. She has tried nothing for the symptoms. The  treatment provided no relief.     Review of Systems   Constitutional: Negative.  Negative for activity change, diaphoresis, fever, unexpected weight change and weight loss.   HENT: Negative.  Negative for congestion, ear discharge, hearing loss, rhinorrhea, sore throat and voice change.    Eyes: Negative.  Negative for pain, discharge and visual disturbance.   Respiratory: Negative.  Negative for chest tightness, shortness of breath and wheezing.    Cardiovascular: Negative.  Negative for chest pain.   Gastrointestinal: Negative.  Negative for abdominal distention, abdominal pain, anal bleeding, bowel incontinence, constipation and nausea.   Endocrine: Negative.  Negative for cold intolerance, polydipsia and polyuria.   Genitourinary: Negative.  Negative for bladder incontinence, decreased urine volume, difficulty urinating, dysuria, frequency, menstrual problem, pelvic pain and vaginal pain.   Musculoskeletal: Positive for arthralgias, back pain and myalgias. Negative for gait problem.   Skin: Negative.  Negative for color change, pallor and wound.   Allergic/Immunologic: Negative.  Negative for environmental allergies and immunocompromised state.   Neurological: Negative.  Negative for dizziness, tingling, tremors, seizures, speech difficulty, numbness, headaches and paresthesias.   Hematological: Negative.  Negative for adenopathy. Does not bruise/bleed easily.   Psychiatric/Behavioral: Negative.  Negative for agitation, confusion, decreased concentration, hallucinations, self-injury and suicidal ideas. The patient is not nervous/anxious.        PMH/PSH/FH/SH/MED/ALLERGY reviewed    Objective:       Vitals:    05/02/17 1017   BP: (!) 150/82   Pulse: 76       Physical Exam   Constitutional: She is oriented to person, place, and time. She appears well-developed and well-nourished. No distress.   HENT:   Head: Normocephalic and atraumatic.   Right Ear: External ear normal.   Left Ear: External ear normal.   Nose:  Nose normal.   Mouth/Throat: Oropharynx is clear and moist. No oropharyngeal exudate.   Eyes: Conjunctivae and EOM are normal. Pupils are equal, round, and reactive to light. Right eye exhibits no discharge. Left eye exhibits no discharge. No scleral icterus.   Neck: Normal range of motion. Neck supple. No JVD present. No tracheal deviation present. No thyromegaly present.   Cardiovascular: Normal rate, regular rhythm, normal heart sounds and intact distal pulses.  Exam reveals no gallop and no friction rub.    No murmur heard.  Pulmonary/Chest: Effort normal and breath sounds normal. No stridor. She has no wheezes. She has no rales. She exhibits no tenderness.   Abdominal: Soft. Bowel sounds are normal. She exhibits no distension and no mass. There is no tenderness. There is no rebound and no guarding. No hernia.   Musculoskeletal: Normal range of motion. She exhibits tenderness (mild TTP medial and lateral joint line B/L knee. No effusion and no ligament strain.). She exhibits no edema.   Mild TTP L3-L5 and SLRT negative B/L   Lymphadenopathy:     She has no cervical adenopathy.   Neurological: She is alert and oriented to person, place, and time. She has normal reflexes. She displays normal reflexes. No cranial nerve deficit. She exhibits normal muscle tone. Coordination normal.   Skin: Skin is warm and dry. No rash noted. She is not diaphoretic. No erythema. No pallor.   Psychiatric: She has a normal mood and affect. Her behavior is normal. Judgment and thought content normal.       Assessment:       1. Essential hypertension    2. Bilateral primary osteoarthritis of knee    3. Osteoporosis, unspecified osteoporosis type, unspecified pathological fracture presence    4. Lumbar disc disease    5. PVD (peripheral vascular disease)    6. Pituitary adenoma        Plan:       Shira was seen today for hypertension.    Diagnoses and all orders for this visit:    Essential hypertension  -     amlodipine (NORVASC) 10 MG  tablet; Take 1 tablet (10 mg total) by mouth once daily.    Bilateral primary osteoarthritis of knee    Osteoporosis, unspecified osteoporosis type, unspecified pathological fracture presence  -     DXA Bone Density Spine And Hip; Future    Lumbar disc disease    PVD (peripheral vascular disease)    Pituitary adenoma      HTN  -not at goal  -ok to increase norvasc 10 mg daily. If side effects then change to losartan.    Low back and knee pain  -DJD/OA  -rest, heat or ice application  -aspercreme local application  -glucosamine and chondroitin sulfate supplementation  -mobic prn    PVD  -stable    Pituitary adenoma  -stable    OA knee B/L  -stable    Osteoporosis  -dexa then decide Reclast    Spent adequate time in obtaining history and explaining differentials      40 minutes spent during this visit of which greater than 50% devoted to face-face counseling and coordination of care regarding diagnosis and management plan    Return in about 4 months (around 9/2/2017), or if symptoms worsen or fail to improve.

## 2017-05-06 ENCOUNTER — HOSPITAL ENCOUNTER (OUTPATIENT)
Dept: RADIOLOGY | Facility: HOSPITAL | Age: 77
Discharge: HOME | End: 2017-05-06
Attending: FAMILY MEDICINE
Payer: MEDICARE

## 2017-05-06 DIAGNOSIS — M25.561 CHRONIC PAIN OF BOTH KNEES: ICD-10-CM

## 2017-05-06 DIAGNOSIS — M51.36 LUMBAR DEGENERATIVE DISC DISEASE: ICD-10-CM

## 2017-05-06 DIAGNOSIS — M25.562 CHRONIC PAIN OF BOTH KNEES: ICD-10-CM

## 2017-05-06 DIAGNOSIS — M54.16 LUMBAR RADICULOPATHY: ICD-10-CM

## 2017-05-06 DIAGNOSIS — G89.29 CHRONIC PAIN OF BOTH KNEES: ICD-10-CM

## 2017-05-06 PROCEDURE — 72148 MRI LUMBAR SPINE W/O DYE: CPT | Mod: TC

## 2017-05-06 PROCEDURE — 73560 X-RAY EXAM OF KNEE 1 OR 2: CPT | Mod: TC,50

## 2017-05-06 PROCEDURE — 72148 MRI LUMBAR SPINE W/O DYE: CPT | Mod: 26,,, | Performed by: RADIOLOGY

## 2017-05-06 PROCEDURE — 73560 X-RAY EXAM OF KNEE 1 OR 2: CPT | Mod: 26,50,, | Performed by: RADIOLOGY

## 2017-05-08 ENCOUNTER — HOSPITAL ENCOUNTER (OUTPATIENT)
Dept: RADIOLOGY | Facility: HOSPITAL | Age: 77
Discharge: HOME | End: 2017-05-08
Attending: FAMILY MEDICINE
Payer: MEDICARE

## 2017-05-08 ENCOUNTER — TELEPHONE (OUTPATIENT)
Dept: FAMILY MEDICINE | Facility: CLINIC | Age: 77
End: 2017-05-08

## 2017-05-08 DIAGNOSIS — M81.0 OSTEOPOROSIS, UNSPECIFIED OSTEOPOROSIS TYPE, UNSPECIFIED PATHOLOGICAL FRACTURE PRESENCE: ICD-10-CM

## 2017-05-08 PROCEDURE — 77080 DXA BONE DENSITY AXIAL: CPT | Mod: 26,,, | Performed by: RADIOLOGY

## 2017-05-08 PROCEDURE — 77080 DXA BONE DENSITY AXIAL: CPT | Mod: TC

## 2017-05-08 NOTE — TELEPHONE ENCOUNTER
----- Message from Venkat Epstein MD sent at 5/8/2017  9:40 AM CDT -----  Call    Bone density showed Osteoporosis in L spine - let me know if interested in Reclast yearly infusion or Prolia which is 6 monthly

## 2017-05-08 NOTE — TELEPHONE ENCOUNTER
Informed pt her Bone density showed Osteoporosis in L spine. Pt would like to get the Reclast yearly infusion. Pls advise.

## 2017-05-09 ENCOUNTER — OFFICE VISIT (OUTPATIENT)
Dept: PAIN MEDICINE | Facility: CLINIC | Age: 77
End: 2017-05-09
Payer: MEDICARE

## 2017-05-09 VITALS
DIASTOLIC BLOOD PRESSURE: 74 MMHG | BODY MASS INDEX: 22.04 KG/M2 | SYSTOLIC BLOOD PRESSURE: 154 MMHG | HEART RATE: 76 BPM | WEIGHT: 116.63 LBS

## 2017-05-09 DIAGNOSIS — M25.562 PAIN IN BOTH KNEES, UNSPECIFIED CHRONICITY: ICD-10-CM

## 2017-05-09 DIAGNOSIS — M70.51 PES ANSERINUS BURSITIS OF BOTH KNEES: Primary | ICD-10-CM

## 2017-05-09 DIAGNOSIS — M25.561 PAIN IN BOTH KNEES, UNSPECIFIED CHRONICITY: ICD-10-CM

## 2017-05-09 DIAGNOSIS — M46.1 BILATERAL SACROILIITIS: Primary | ICD-10-CM

## 2017-05-09 DIAGNOSIS — M46.1 SACROILIITIS: ICD-10-CM

## 2017-05-09 DIAGNOSIS — M70.52 PES ANSERINUS BURSITIS OF BOTH KNEES: Primary | ICD-10-CM

## 2017-05-09 DIAGNOSIS — M70.61 TROCHANTERIC BURSITIS OF BOTH HIPS: ICD-10-CM

## 2017-05-09 DIAGNOSIS — M70.62 TROCHANTERIC BURSITIS OF BOTH HIPS: ICD-10-CM

## 2017-05-09 PROCEDURE — 3078F DIAST BP <80 MM HG: CPT | Mod: S$GLB,,, | Performed by: ANESTHESIOLOGY

## 2017-05-09 PROCEDURE — 99204 OFFICE O/P NEW MOD 45 MIN: CPT | Mod: 25,S$GLB,, | Performed by: ANESTHESIOLOGY

## 2017-05-09 PROCEDURE — 1157F ADVNC CARE PLAN IN RCRD: CPT | Mod: S$GLB,,, | Performed by: ANESTHESIOLOGY

## 2017-05-09 PROCEDURE — 20610 DRAIN/INJ JOINT/BURSA W/O US: CPT | Mod: S$GLB,,, | Performed by: ANESTHESIOLOGY

## 2017-05-09 PROCEDURE — 1160F RVW MEDS BY RX/DR IN RCRD: CPT | Mod: S$GLB,,, | Performed by: ANESTHESIOLOGY

## 2017-05-09 PROCEDURE — 99999 PR PBB SHADOW E&M-EST. PATIENT-LVL III: CPT | Mod: PBBFAC,,, | Performed by: ANESTHESIOLOGY

## 2017-05-09 PROCEDURE — 3077F SYST BP >= 140 MM HG: CPT | Mod: S$GLB,,, | Performed by: ANESTHESIOLOGY

## 2017-05-09 PROCEDURE — 1125F AMNT PAIN NOTED PAIN PRSNT: CPT | Mod: S$GLB,,, | Performed by: ANESTHESIOLOGY

## 2017-05-09 PROCEDURE — 1159F MED LIST DOCD IN RCRD: CPT | Mod: S$GLB,,, | Performed by: ANESTHESIOLOGY

## 2017-05-09 RX ORDER — TRIAMCINOLONE ACETONIDE 40 MG/ML
40 INJECTION, SUSPENSION INTRA-ARTICULAR; INTRAMUSCULAR
Status: COMPLETED | OUTPATIENT
Start: 2017-05-09 | End: 2017-05-09

## 2017-05-09 RX ORDER — GABAPENTIN 100 MG/1
100 CAPSULE ORAL 3 TIMES DAILY
COMMUNITY
End: 2017-05-30

## 2017-05-09 RX ADMIN — TRIAMCINOLONE ACETONIDE 40 MG: 40 INJECTION, SUSPENSION INTRA-ARTICULAR; INTRAMUSCULAR at 11:05
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
## 2017-05-09 NOTE — MR AVS SNAPSHOT
[facility]  [address]  [facility] [place] [address]  Phone: [phone]                  Shira De Leon   2017 10:00 AM   Office Visit    Descripción:  Female : 1940   Personal Médico:  Do Turpin MD   Departamento:  [facility]           Razón de la nohelia     Back Pain     Knee Pain                Lista de tareas           Citas próximas        Personal Médico Departamento Tfno del dpto    2017 9:00 AM Russ Hernandez, [facility] [place] [facility] [phone]    2017 10:30 AM MD [facility] [phone]      Metas (5 Years of Data)     Ninguna      [facility] en Llamada     [facility] En Llamada Línea de Enfermeras - Asistencia   Enfermeras registradas de [facility] pueden ayudarle a reservar barb nohelia, proveer educación para la jose l, asesoría clínica, y otros servicios de asesoramiento.   Llame para carlton servicio gratuito a [phone].             Medicamentos           Mensaje sobre Medicamentos     Verificar los cambios y / o adiciones a dalal régimen de medicación son los mismos que discutir con dalal médico. Si cualquiera de estos cambios o adiciones son incorrectos, por favor notifique a dalal proveedor de atención médica.             Verifique que la siguiente lista de medicamentos es barb representación exacta de los medicamentos que está tomando actualmente. Si no hay ningunos reportados, la lista puede estar en crowley. Si no es correcta, por favor póngase en contacto con dalla proveedor de atención médica. Lleve esta lista con usted en alysha de emergencia.           Medicamentos Actuales     amlodipine (NORVASC) 10 MG tablet Take 1 tablet (10 mg total) by mouth once daily.    calcium-vitamin D 500-125 mg-unit tablet Take 1 tablet by mouth 2 (two) times daily.    ciprofloxacin HCl (CIPRO) 500 MG tablet TK 1 T PO  BID    gabapentin (NEURONTIN) 100 MG capsule Take 100 mg by mouth 3 (three) times daily.    pantoprazole  (PROTONIX) 40 MG tablet TAKE 1 TABLET(40 MG) BY MOUTH EVERY DAY           Información de referencia clínica           Chiqui signos vitales aleksandra     PS Pulso Peso BMI (IMC)          154/74 76 52.9 kg (116 lb 10 oz) 22.04 kg/m2        Blood Pressure          Most Recent Value    BP  (!)  154/74      Alergias     A partir del:  2017        Penicillins      Vacunas     Administradas en la fecha de la visita:  2017        None      Registrarse para [facility]     La activación de dalal cuenta [facility] es tan fácil dayna 1-2-3!    1) Ir a [email or web address], seleccione Registrarse Ahora, meter el código de activación y dalal fecha de nacimiento, y seleccione Próximo.    [id number]  Expires: 2017 11:42 AM      2) Crear un nombre de usuario y contraseña para usar cuando se visita [facility] en el futuro y selecciona barb pregunta de seguridad en alysha de que pierda dalal contraseña y seleccione Próximo.    3) Introduzca dalal dirección de correo electrónico y jennifer nishant en Registrarse!    Información Adicional  Si tiene alguna pregunta, por favor, e-mail [email or web address] o llame al [phone] para hablar con nuestro personal. Recuerde, [facility] no debe ser usada para necesidades urgentes. En alysha de emergencia médica, llame al 911.        Language Assistance Services     ATTENTION: Language assistance services are available, free of charge. Please call [phone].      ATENCIÓN: Si habla español, tiene a dalal disposición servicios gratuitos de asistencia lingüística. Llame al [phone].     CHÚ Ý: N?u b?n nói Ti?ng Vi?t, có các d?ch v? h? tr? ngôn ng? mi?n phí dành cho b?n. G?i s? [phone].         [facility] cumple con las leyes federales aplicables de derechos civiles y no discrimina por motivos de alfredo, color, origen nacional, edad, discapacidad, o sexo.                 Shira Moises   2017 10:00 AM   Office Visit    Description:  Female : 1940   Provider:  Do Turpin MD    Department:  [facility]           Reason for Visit     Back Pain     Knee Pain                To Do List           Future Appointments        Provider Department Dept Phone    5/11/2017 9:00 AM [facility] [place] [facility] [phone]    6/13/2017 10:30 AM Do Turpin MD [facility] [phone]      Goals     None      [facility]     [facility] Care [facility] - 24/7 Assistance  Unless otherwise directed by your provider, please contact [facility], our nurse care line that is available for 24/7 assistance.     Registered nurses in the [facility] Center provide: appointment scheduling, clinical advisement, health education, and other advisory services.  Call: [phone] (toll free)               Medications           Message regarding Medications     Verify the changes and/or additions to your medication regime listed below are the same as discussed with your clinician today.  If any of these changes or additions are incorrect, please notify your healthcare provider.             Verify that the below list of medications is an accurate representation of the medications you are currently taking.  If none reported, the list may be blank. If incorrect, please contact your healthcare provider. Carry this list with you in case of emergency.           Current Medications     amlodipine (NORVASC) 10 MG tablet Take 1 tablet (10 mg total) by mouth once daily.    calcium-vitamin D 500-125 mg-unit tablet Take 1 tablet by mouth 2 (two) times daily.    ciprofloxacin HCl (CIPRO) 500 MG tablet TK 1 T PO  BID    gabapentin (NEURONTIN) 100 MG capsule Take 100 mg by mouth 3 (three) times daily.    pantoprazole (PROTONIX) 40 MG tablet TAKE 1 TABLET(40 MG) BY MOUTH EVERY DAY           Clinical Reference Information           Your Vitals Were     BP Pulse Weight BMI          154/74 76 52.9 kg (116 lb 10 oz) 22.04 kg/m2        Blood Pressure          Most Recent  Value    BP  (!)  154/74      Allergies as of 5/9/2017     Penicillins      Immunizations Administered on Date of Encounter - 5/9/2017     None      [facility] Sign-Up     Activating your [facility] account is as easy as 1-2-3!     1) Visit [email or web address], select Sign Up Now, enter this activation code and your date of birth, then select Next.  [email or web address]  Expires: 5/19/2017 11:42 AM      2) Create a username and password to use when you visit [facility] in the future and select a security question in case you lose your password and select Next.    3) Enter your e-mail address and click Sign Up!    Additional Information  If you have questions, please e-mail [email or web address] or call [phone] to talk to our [facility] staff. Remember, [facility] is NOT to be used for urgent needs. For medical emergencies, dial 911.         Language Assistance Services     ATTENTION: Language assistance services are available, free of charge. Please call [phone].      ATENCIÓN: Si habla español, tiene a dalal disposición servicios gratuitos de asistencia lingüística. Llame al [phone].     CHÚ Ý: N?u b?n nói Ti?ng Vi?t, có các d?ch v? h? tr? ngôn ng? mi?n phí dành cho b?n. G?i s? [phone].         [facility] complies with applicable Federal civil rights laws and does not discriminate on the basis of race, color, national origin, age, disability, or sex.

## 2017-05-09 NOTE — TELEPHONE ENCOUNTER
ordered Reclast - I thing [facility]fusion [facility] will contact her - if not - let us know so we can contact them to make sure we have schedule in place

## 2017-05-09 NOTE — TELEPHONE ENCOUNTER
Informed pt Dr. Epstein put in the order for the Reclast. Infusion center will contact her to schedule appt. If she has not been contacted by next week to pls give the office a call.

## 2017-05-09 NOTE — LETTER
May 9, 2017      Jorge A Santiago MD  [address]  [address]  [facility] [place] [address]           [facility]  [address]  [facility] [place] [address]  Phone: [phone]          Patient: Shira De Leon   MR Number: [id number]   YOB: 1940   Date of Visit: 5/9/2017       Dear Dr. Jorge A Santiago:    Thank you for referring Shira De Leon to me for evaluation. Attached you will find relevant portions of my assessment and plan of care.    If you have questions, please do not hesitate to call me. I look forward to following Shira De Leon along with you.    Sincerely,    Do Turpin MD    Enclosure  CC:  No Recipients    If you would like to receive this communication electronically, please contact [email or web address] or [phone] to request more information on [employer] Link access.    For providers and/or their staff who would like to refer a patient to [facility], please contact us through our one-stop-shop provider referral line, [facility], at [phone].    If you feel you have received this communication in error or would no longer like to receive these types of communications, please e-mail [email or web address]

## 2017-05-10 ENCOUNTER — TELEPHONE (OUTPATIENT)
Dept: PAIN MEDICINE | Facility: CLINIC | Age: 77
End: 2017-05-10

## 2017-05-10 NOTE — TELEPHONE ENCOUNTER
----- Message from Roseanna Person sent at 5/9/2017  3:22 PM CDT -----  Contact: [phone]/ self   Patient called in returning your call. Please advise

## 2017-05-10 NOTE — TELEPHONE ENCOUNTER
Called pt back, she stated to disregard her phone call.  She has everything in place for her next appt.  Pt verbalized understanding of our conversation.

## 2017-05-11 ENCOUNTER — OFFICE VISIT (OUTPATIENT)
Dept: OPTOMETRY | Facility: CLINIC | Age: 77
End: 2017-05-11
Payer: MEDICARE

## 2017-05-11 DIAGNOSIS — D35.2 PITUITARY ADENOMA: ICD-10-CM

## 2017-05-11 DIAGNOSIS — G51.0 BELL'S PALSY: ICD-10-CM

## 2017-05-11 DIAGNOSIS — H52.7 REFRACTIVE ERROR: ICD-10-CM

## 2017-05-11 DIAGNOSIS — Z96.1 PSEUDOPHAKIA OF BOTH EYES: ICD-10-CM

## 2017-05-11 DIAGNOSIS — H18.453 SALZMANN'S NODULAR DYSTROPHY, BILATERAL: Primary | ICD-10-CM

## 2017-05-11 DIAGNOSIS — H04.123 DRY EYES, BILATERAL: ICD-10-CM

## 2017-05-11 PROCEDURE — 92015 DETERMINE REFRACTIVE STATE: CPT | Mod: S$GLB,,, | Performed by: OPTOMETRIST

## 2017-05-11 PROCEDURE — 99999 PR PBB SHADOW E&M-EST. PATIENT-LVL II: CPT | Mod: PBBFAC,,, | Performed by: OPTOMETRIST

## 2017-05-11 PROCEDURE — 92014 COMPRE OPH EXAM EST PT 1/>: CPT | Mod: S$GLB,,, | Performed by: OPTOMETRIST

## 2017-05-11 NOTE — PROGRESS NOTES
HPI     [facility] Salzmann's nodular dystrophy, bells palsy OD, PC/IOL OU, dry eyes.    Uses AT's at night. Vision seems worse, distance and near. Glasses about 1   1/2 yr. Old  Using refresh  Pituitary scan up to date and was fine       Last edited by Russ Hernandez, OD on 5/11/2017 11:05 AM.     ROS     Negative for: Constitutional, Gastrointestinal, Neurological, Skin,   Genitourinary, Musculoskeletal, HENT, Endocrine, Cardiovascular, Eyes,   Respiratory, Psychiatric, Allergic/Imm, Heme/Lymph    Last edited by Russ Hernandez, OD on 5/11/2017 10:13 AM. (History)        Assessment /Plan     For exam results, see Encounter Report.    Salzmann's nodular dystrophy, bilateral    Dry eyes, bilateral    Bell's palsy - Right Eye    Pituitary adenoma    Pseudophakia of both eyes    Refractive error      1,2. Stable, cont with tear drops. Refresh 2x/day. RTC yearly or sooner if discomfort or vision changes.  3. Monitor condition. Patient to report any changes. RTC 1 year recheck.  4. Last MRI normal per pt, up to date, normal CVF. RTC yearly.   5. Monitor condition. Patient to report any changes. RTC 1 year recheck.       6. Spec Rx given. Different lens options discussed with patient. RTC 1 year full exam.

## 2017-05-15 NOTE — DISCHARGE INSTRUCTIONS
Home Care Instructions Pain Management:    1.  DIET:    You may resume your normal diet today.    2.  BATHING:    You may shower with luke warm water.    3.  DRESSING:    You may remove your bandage today.    4.  ACTIVITY LEVEL:      You may resume your normal activities 24 hours after your procedure.    5.  MEDICATIONS:    You may resume your normal medications today.    6.  SPECIAL INSTRUCTIONS:    No heat to the injection site for 24 hours including bath or shower, heating pad, moist heat or hot tubs.    Use an ice pack to the injection site for any pain or discomfort.  Apply ice packs for 20 minute intervals as needed.    If you have received any sedatives by mouth today, you can not drive for 12 hours.    If you have received sedation through an IV, you can not drive for 24 hours.    PLEASE CALL YOUR DOCTOR FOR THE FOLLOWIN.  Redness or swelling around the injection site.  2.  Fever of 101 degrees.  3.  Drainage (pus) from the injection site.  4.  For any continuous bleeding (some dried blood over the incision is normal.)    FOR EMERGENCIES:    If any unusual problems or difficulties occur during clinic hours, call [phone] or dial [phone].    Follow up with with your physician in 2-3 weeks.

## 2017-05-16 ENCOUNTER — TELEPHONE (OUTPATIENT)
Dept: PAIN MEDICINE | Facility: CLINIC | Age: 77
End: 2017-05-16

## 2017-05-18 ENCOUNTER — SURGERY (OUTPATIENT)
Age: 77
End: 2017-05-18

## 2017-05-18 ENCOUNTER — HOSPITAL ENCOUNTER (OUTPATIENT)
Facility: HOSPITAL | Age: 77
Discharge: HOME | End: 2017-05-18
Attending: ANESTHESIOLOGY | Admitting: ANESTHESIOLOGY
Payer: MEDICARE

## 2017-05-18 VITALS
RESPIRATION RATE: 18 BRPM | HEIGHT: 61 IN | BODY MASS INDEX: 21.52 KG/M2 | OXYGEN SATURATION: 97 % | TEMPERATURE: 98 F | WEIGHT: 114 LBS | HEART RATE: 76 BPM | SYSTOLIC BLOOD PRESSURE: 148 MMHG | DIASTOLIC BLOOD PRESSURE: 72 MMHG

## 2017-05-18 PROCEDURE — 99152 MOD SED SAME PHYS/QHP 5/>YRS: CPT | Mod: ,,, | Performed by: ANESTHESIOLOGY

## 2017-05-18 PROCEDURE — 27096 INJECT SACROILIAC JOINT: CPT | Mod: 50 | Performed by: ANESTHESIOLOGY

## 2017-05-18 PROCEDURE — 20610 DRAIN/INJ JOINT/BURSA W/O US: CPT | Mod: 50 | Performed by: ANESTHESIOLOGY

## 2017-05-18 PROCEDURE — 20610 DRAIN/INJ JOINT/BURSA W/O US: CPT | Mod: 50,59,, | Performed by: ANESTHESIOLOGY

## 2017-05-18 PROCEDURE — 63600175 PHARM REV CODE 636 W HCPCS: Performed by: ANESTHESIOLOGY

## 2017-05-18 PROCEDURE — 25500020 PHARM REV CODE 255: Performed by: ANESTHESIOLOGY

## 2017-05-18 PROCEDURE — 25000003 PHARM REV CODE 250: Performed by: ANESTHESIOLOGY

## 2017-05-18 PROCEDURE — 27096 INJECT SACROILIAC JOINT: CPT | Mod: 50,,, | Performed by: ANESTHESIOLOGY

## 2017-05-18 RX ORDER — LIDOCAINE HYDROCHLORIDE 10 MG/ML
INJECTION, SOLUTION EPIDURAL; INFILTRATION; INTRACAUDAL; PERINEURAL
Status: DISCONTINUED | OUTPATIENT
Start: 2017-05-18 | End: 2017-05-18 | Stop reason: HOSPADM

## 2017-05-18 RX ORDER — FENTANYL CITRATE 50 UG/ML
INJECTION, SOLUTION INTRAMUSCULAR; INTRAVENOUS
Status: DISCONTINUED | OUTPATIENT
Start: 2017-05-18 | End: 2017-05-18 | Stop reason: HOSPADM

## 2017-05-18 RX ORDER — MIDAZOLAM HYDROCHLORIDE 1 MG/ML
INJECTION, SOLUTION INTRAMUSCULAR; INTRAVENOUS
Status: DISCONTINUED | OUTPATIENT
Start: 2017-05-18 | End: 2017-05-18 | Stop reason: HOSPADM

## 2017-05-18 RX ORDER — SODIUM CHLORIDE, SODIUM LACTATE, POTASSIUM CHLORIDE, CALCIUM CHLORIDE 600; 310; 30; 20 MG/100ML; MG/100ML; MG/100ML; MG/100ML
INJECTION, SOLUTION INTRAVENOUS CONTINUOUS
Status: DISCONTINUED | OUTPATIENT
Start: 2017-05-18 | End: 2017-05-18 | Stop reason: HOSPADM

## 2017-05-18 RX ORDER — TRIAMCINOLONE ACETONIDE 40 MG/ML
INJECTION, SUSPENSION INTRA-ARTICULAR; INTRAMUSCULAR
Status: DISCONTINUED | OUTPATIENT
Start: 2017-05-18 | End: 2017-05-18 | Stop reason: HOSPADM

## 2017-05-18 RX ADMIN — LIDOCAINE HYDROCHLORIDE 5 ML: 10 INJECTION, SOLUTION EPIDURAL; INFILTRATION; INTRACAUDAL; PERINEURAL at 09:05

## 2017-05-18 RX ADMIN — FENTANYL CITRATE 50 MCG: 50 INJECTION, SOLUTION INTRAMUSCULAR; INTRAVENOUS at 09:05

## 2017-05-18 RX ADMIN — SODIUM CHLORIDE, SODIUM LACTATE, POTASSIUM CHLORIDE, AND CALCIUM CHLORIDE: .6; .31; .03; .02 INJECTION, SOLUTION INTRAVENOUS at 08:05

## 2017-05-18 RX ADMIN — TRIAMCINOLONE ACETONIDE 60 MG: 40 INJECTION, SUSPENSION INTRA-ARTICULAR; INTRAMUSCULAR at 09:05

## 2017-05-18 RX ADMIN — IOHEXOL 5 ML: 300 INJECTION, SOLUTION INTRAVENOUS at 09:05

## 2017-05-18 RX ADMIN — MIDAZOLAM 1 MG: 1 INJECTION INTRAMUSCULAR; INTRAVENOUS at 09:05
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
## 2017-05-18 NOTE — IP AVS SNAPSHOT
[facility]  [address]  [place] [address]  Phone: [phone]           Instrucciones de Jeannette de Pacientes  Nuestra meta es prepararlo para el éxito. Ina paquete incluye información sobre dalal condición, medicamentos e instrucciones para cuidados en el hogar. Home le ayudará a cuidarse y prevenir la necesidad de volver al hospital.     Por favor, hable con dalal enfermero o enfermera si tiene alguna pregunta..     Hay muchos detalles a recordar cuando se prepara para salir del hospital. Home es lo que necesita hacer:    1. [place] dalal medicina. Si usted tiene barb receta, revise la lista de medicamentos en las siguientes páginas. Es posible que tenga nuevos medicamentos para recoger en la farmacia y otros que tendrá que dejar de karen. Revise las instrucciones sobre cómo y cuándo karen billy medicamentos. Consulte con el médico o el enfermeras si no está seguro de qué hacer.    2. Ir a billy citas de seguimiento. La información específica de seguimiento aparece en las siguientes páginas. Usted puede ser contactado por barb enfermera o proveedor clínico sobre las próximas citas. Estar seguro de que tiene todos los números de teléfono para comunicarse si es necesario. Por favor, póngase en contacto con la oficina de dalal profesional médico si no puede hacer barb nohelia.     3. Esté atento a señales de advertencia. El médico o la enfermera le dará señales de alarma detallados que debe observar y cuándo llamar para la ayuda. Estas instrucciones también pueden incluir información educativa acerca de dalal condición. Si usted experimenta cualquiera de las señales de advertencia para dalal jose l, llame a dalal médico.             [facility]    Si dalal médico no le ha indicado a lo contrário, por favor   contactar a [facility] [place], nuestra línea de cuidado de enfermeras está disponible para asistirle 24/7.    [phone] (servicio grat[facility]velace [facility] Hospitalo)    Enfermeras registradas de [facility] pueden ayudarle a reservar barb nohelia,  proveer educación para la jose l, asesoría clínica, y otros servicios de asesoramiento.                  ** Verificar la lista de medicamentos es correcta y está actualizada. Llevar esto con usted en alysha de emergencia. Si chiqui medicamentos hunter cambiado, por favor notifique a dalal proveedor de atención médica.             Lista de medicamentos      Aviso     No se puede mostrar los medicamentos de rylie porque el paciente todavía no ha sido admitido.               Por favor traiga a todos las citas de seguimiento:    1. Sylvia copia de las instrucciones de rylie.  2. Todos los medicamentos que está tomando carlton momento, en chiqui envases originales.  3. Identificación y tarjeta de seguro.    Por favor llegue 15 minutos antes de la hora de la nohelia.    Por favor llame con 24 horas de antelación si tiene que cambiar dalal nohelia y / o tiempo.        Chiqui Citas Programadas     Jun 13, 2017 10:30 AM CDT   Established Patient Visit with Do Turpin MD   [place] [facility] ([facility])    [address]  [place] [address]   [phone]              Chiqui cirugías futuras / Procedimientos     May 18, 2017   Surgery with Do Turpin MD   [facility] ([facility])    [address] [place] [address]   [phone]                  Instrucciones a dennise de rylie       Home Care Instructions Pain Management:    1.  DIET:    You may resume your normal diet today.    2.  BATHING:    You may shower with luke warm water.    3.  DRESSING:    You may remove your bandage today.    4.  ACTIVITY LEVEL:      You may resume your normal activities 24 hours after your procedure.    5.  MEDICATIONS:    You may resume your normal medications today.    6.  SPECIAL INSTRUCTIONS:    No heat to the injection site for 24 hours including bath or shower, heating pad, moist heat or hot tubs.    Use an ice pack to the injection site for any pain or discomfort.  Apply ice packs for 20 minute  intervals as needed.    If you have received any sedatives by mouth today, you can not drive for 12 hours.    If you have received sedation through an IV, you can not drive for 24 hours.    PLEASE CALL YOUR DOCTOR FOR THE FOLLOWIN.  Redness or swelling around the injection site.  2.  Fever of 101 degrees.  3.  Drainage (pus) from the injection site.  4.  For any continuous bleeding (some dried blood over the incision is normal.)    FOR EMERGENCIES:    If any unusual problems or difficulties occur during clinic hours, call [phone] or dial 911.    Follow up with with your physician in 2-3 weeks.         Información de Admisión     Fecha y hora Proveedor Departamento CSN    2017  9:15 AM Do Turpin MD [facility] [id number]      Los proveedores de cuidado     Personal Médico Rol Especialidad Teléfono principal de la oficina    Do Turpin MD Attending Provider Pain Medicine [phone]      Recent Lab Values     No lab values to display.      Alergias     A partir del:  5/15/2017           Reacciones    Penicillins Rash      Directiva Anticipada     Sylvia directiva anticipada es un documento que, en alysha de que ya no capaz de hacer decisiones por sí mismo, le dice a dalal equipo médico qué tipo de tratamiento quiere o no quiere recibir, o que le gustaría karen esas decisiones para usted . Si actualmente no tiene sylvia directiva anticipada, [facility] le anima a crear luis a. Para más información llame al: [phone] ([phone]), o entrando en [email or web address].        Language Assistance Services     ATTENTION: Language assistance services are available, free of charge. Please call [phone].      ATENCIÓN: Si habla español, tiene a dalal disposición servicios gratuitos de asistencia lingüística. Llame al [phone].     CHÚ Ý: N?u b?n nói Ti?ng Vi?t, có các d?ch v? h? tr? ngôn ng? mi?n phí dành cho b?n. G?i s? [phone].        Educación  para accidente cerebrovascular       Instrucciones de rylie para un ataque cerebral  Le hunter diagnosticado un ataque cerebral (llamado también accidente cerebrovascular). Everardo un ataque cerebral, la neyda mag de circular en parte del cerebro, lo cual puede dañar zonas del cerebro que controlan otras partes del cuerpo. Los síntomas después de un ataque cerebral dependen de la parte del cerebro afectada.  Factores de riesgo para un ataque cerebral  Sylvia vez que ha tenido un ataque cerebral, tiene mayor riesgo de tener otro. A continuación, se enumeran otros factores que pueden aumentar dalal riesgo de tener otro ataque cerebral:  · Presión arterial rylie  · Colesterol en neyda alto  · Fumar cigarrillos o cigarros  · Diabetes  · Enfermedad de la arteria carótida u otras arterias  · Fibrilación auricular, aleteo auricular u otra enfermedad cardíaca  · Falta de actividad física  · Obesidad  · Ciertos trastornos de la neyda ([place] la anemia de células falciformes)  · Consumo excesivo de alcohol  · Abuso de drogas ilegales  · Dalal alfredo  · Dalal sexo  · Antecedentes familiares de ataque cerebral  · Sylvia dieta con alto contenido de alimentos fritos, grasosos o salados  Cambios en la rutina diaria  Realizar billy tareas habituales quizás le resulte difícil después de sadie tenido un ataque cerebral, yahir puede aprender nuevas maneras de manejar billy actividades diarias. De hecho, hacer las actividades diarias puede ayudarle a recuperar la fortaleza muscular y a devolverle el funcionamiento a las extremidades afectadas. Sea paciente, dese tiempo para adaptarse y valore el avance que vaya haciendo.  Actividades diarias    Puede correr el riesgo de caerse. Chantal cambios en dalal casa que le permitan caminar con mayor facilidad. Un terapeuta decidirá si necesita algún dispositivo auxiliar para caminar de manera horton.  Quizás deba tomas a un terapeuta ocupacional o un fisioterapeuta para aprender nuevas maneras de hacer las cosas. Por ejemplo,  puede que necesite hacer ajustes al darse un baño o vestirse.  · Tenga en cuenta las siguientes sugerencias al ducharse o bañarse:  ¨ Pruebe la temperatura del agua con la mano o el pie no afectados en el ataque cerebral.  ¨ Emplee barras de sujeción, un asiento para la ducha, un regador manual y un cepillo de agata eliza.  · Tenga en cuenta las siguientes sugerencias al vestirse:  ¨ Vístase sentado, comenzando por la extremidad o el lado afectados.  ¨ Use camisetas que pueda quitarse fácilmente por encima de la ellen y pantalones o faldas con cintura elástica.  ¨ Use cierres con neo amarrados a la lengüeta del cierre.  Cambios en dalal estilo de ana  · [place] billy medicamentos exactamente según lo indicado. No se saltee ninguna dosis.  · Dalal proveedor de atención médica le dará información sobre los cambios en la dieta que probablemente necesite hacer según dalal situación. Probablemente le recomiende que consulte a un dietista certificado para que le ayude con esos cambios. Los cambios pueden incluir:  ¨ Reducir el consumo de grasas y colesterol  ¨ Reducir el consumo de sodio (sal), especialmente si tiene la presión arterial rylie  ¨ Catawba más verduras y frutas frescas  ¨ Catawba proteínas magras, de matthew tales [place] pescado, carne de ave y legumbres (frijoles y chícharos o arvejas) y comer menos ned lemus y procesadas  ¨ Consumir productos lácteos con menos contenido de grasa  ¨ Consumir aceites vegetales o de nueces en cantidad moderada  ¨ Limitar los dulces y los alimentos procesados tales [place] las matty fritas (chips), las galletas o los alimentos panificados  · Comience un programa de ejercicios. Consulte con dalal médico sobre la forma en que puede comenzar y qué cantidad de actividad le recomienda que jennifer por día o por semana. Actividades simples, [place] caminar o trabajar en el jardín, pueden serle de mucho beneficio.  · Reduzca la cantidad de bebidas alcohólicas que mirella. Los hombres no deben beber más de dos  copas de bebidas alcohólicas al día. Las mujeres deben limitarse a barb copa de bebida alcohólica al día.  · Conozca cuál es dalal nivel de colesterol. Siga las recomendaciones de dalal médico sobre cómo mantener dalal nivel de colesterol controlado.  · Si fuma, deje de hacerlo. Inscríbase en un programa para dejar de fumar, lo que le permitirá tener mayores probabilidades de lograrlo. Hable con dalal médico para tomas qué medicamentos o qué otros métodos pueden ayudarle a dejar de fumar.  · Aprenda técnicas de manejo del estrés que le ayudarán a sobrellevar la tensión del hogar y del trabajo.  Visitas de control  · Cumpla con billy citas médicas. Un seguimiento cercano es importante para la rehabilitación y recuperación después de un ataque cerebral.  · Algunos medicamentos requieren análisis de neyda para tomas cuál es dalal avance o si surge algún problema. Cumpla las citas de control para hacerse los análisis de neyda ordenados por billy médicos.     Cuándo debe buscar atención médica  Llame al 911 de inmediato si tiene cualquiera de estos síntomas:  · Debilidad, cosquilleo o pérdida de sensación en un lado de dalal bala o dalal cuerpo.  · Repentina visión doble o dificultad para tomas con luis a o los dos ojos.  · Problema repentino para hablar o hablar arrastrando las palabras.  · Problemas para comprender lo que otros dicen.  · Dolor de ellen marina y repentino.  · Mareo, pérdida del equilibrio o sensación de caerse.  · Desmayos o convulsiones.  Hay cass signos principales que le indicarán que puede tratarse de un ataque cerebral. Si los ve, tiene que llamar al 911 rápido. Estos signos son:  Caída de la bala: Un lado de la bala se  o está adormecido. Cuando la persona sonríe, dalal sonrisa no es uniforme.  Debilidad en un brazo: Un brazo está débil o adormecido. Cuando la persona levanta ambos brazos al mismo tiempo, luis a puede caerse.  Dificultad para hablar: Puede notar que la persona tiene problemas para hablar o que arrastra las  palabras. La persona no puede repetir correctamente barb oración simple cuando se lo piden.  Momento de llamar al 911: Si alguien tiene cualquiera de esos síntomas, incluso aunque desaparezcan, llamen al 911 de inmediato. [place] nota de la hora en la que aparecieron los síntomas por primera vez.   Date Last Reviewed: 8/26/2015  © 5864-8302 [employer]. [address] [place] [id number]. Todos los derechos reservados. Esta información no pretende sustituir la atención médica profesional. Sólo dalal médico puede diagnosticar y tratar un problema de jose l.              Registrarse para [facility]     La activación de dalal cuenta Anandandrews es tan fácil [place] 1-2-3!    1) Ir a [email or web address], seleccione Registrarse Ahora, meter el código de activación y dalal fecha de nacimiento, y seleccione Próximo.    [id number]  Expires: 5/19/2017 11:42 AM      2) Crear un nombre de usuario y contraseña para usar cuando se visita [facility] en el futuro y selecciona barb pregunta de seguridad en alysha de que pierda dalal contraseña y seleccione Próximo.    3) Introduzca dalal dirección de correo electrónico y jennifer [facility] en Registrarse!    Información Adicional  Si tiene alguna pregunta, por favor, e-mail [email or web address] o llame al [phone] para hablar con nuestro personal. Recuerde, [facility] no debe ser usada para necesidades urgentes. En alysha de emergencia médica, llame al 911.         [facility] cumple con las leyes federales aplicables de derechos civiles y no discrimina por motivos de alfredo, color, origen nacional, edad, discapacidad, o sexo.                        [facility]  [address]  [place] [address]  Phone: [phone]           Patient Discharge Instructions   Our goal is to set you up for success. This packet includes information on your condition, medications, and your home care.  It will help you care for yourself to prevent having to return to the hospital.     Please  ask your nurse if you have any questions.      There are many details to remember when preparing to leave the hospital. Here is what you will need to do:    1. Take your medicine. If you are prescribed medications, review your Medication List on the following pages. You may have new medications to  at the pharmacy and others that you'll need to stop taking. Review the instructions for how and when to take your medications. Talk with your doctor or nurses if you are unsure of what to do.     2. Go to your follow-up appointments. Specific follow-up information is listed in the following pages. Your may be contacted by a nurse or clinical provider about future appointments. Be sure we have all of the phone numbers to reach you. Please contact your provider's office if you are unable to make an appointment.     3. Watch for warning signs. Your doctor or nurse will give you detailed warning signs to watch for and when to call for assistance. These instructions may also include educational information about your condition. If you experience any of warning signs to your health, call your doctor.           [facility]  Unless otherwise directed by your provider, please   contact [facility], our nurse care line   that is available for 24/7 assistance.     [phone] (toll-free)     Registered nurses in the [facility]   provide: appointment scheduling, clinical advisement, health education, and other advisory services.              ** Verificar la lista de medicamentos es correcta y está actualizada. Llevar esto con usted en alysha de emergencia. Si billy medicamentos hunter cambiado, por favor notifique a dalal proveedor de atención médica.             Medication List      Notice     Cannot display discharge medications because the patient has not yet been admitted.               Por favor traiga a todos las citas de seguimiento:    1. Sylvia copia de las instrucciones de rylie.  2. Todos los medicamentos  que está tomando carlton momento, en billy envases originales.  3. Identificación y tarjeta de seguro.    Por favor llegue 15 minutos antes de la hora de la nohelia.    Por favor llame con 24 horas de antelación si tiene que cambiar dalal nohelia y / o tiempo.        Your Scheduled Appointments     2017 10:30 AM CDT   Established Patient Visit with Do Turpin MD   [place] [facility] ([facility])    [address]  [place] [address] [phone]              Your Future Surgeries/Procedures     May 18, 2017   Surgery with Do Turpin MD   [facility] ([facility])    [address]  [place] [address] [phone]                  Discharge Instructions       Home Care Instructions Pain Management:    1.  DIET:    You may resume your normal diet today.    2.  BATHING:    You may shower with luke warm water.    3.  DRESSING:    You may remove your bandage today.    4.  ACTIVITY LEVEL:      You may resume your normal activities 24 hours after your procedure.    5.  MEDICATIONS:    You may resume your normal medications today.    6.  SPECIAL INSTRUCTIONS:    No heat to the injection site for 24 hours including bath or shower, heating pad, moist heat or hot tubs.    Use an ice pack to the injection site for any pain or discomfort.  Apply ice packs for 20 minute intervals as needed.    If you have received any sedatives by mouth today, you can not drive for 12 hours.    If you have received sedation through an IV, you can not drive for 24 hours.    PLEASE CALL YOUR DOCTOR FOR THE FOLLOWIN.  Redness or swelling around the injection site.  2.  Fever of 101 degrees.  3.  Drainage (pus) from the injection site.  4.  For any continuous bleeding (some dried blood over the incision is normal.)    FOR EMERGENCIES:    If any unusual problems or difficulties occur during clinic hours, call [phone] or dial 911.    Follow up with with your  physician in 2-3 weeks.         Admission Information     Date & Time Provider Department CSN    5/18/2017  9:15 AM Do Turpin MD [facility] [id number]      Care Providers     Provider Role Specialty Primary office phone    Do Turpin MD Attending Provider Pain Medicine [phone]      Recent Lab Values     No lab values to display.      Allergies as of 5/15/2017        Reactions    Penicillins Rash      Advance Directives     An advance directive is a document which, in the event you are no longer able to make decisions for yourself, tells your healthcare team what kind of treatment you do or do not want to receive, or who you would like to make those decisions for you.  If you do not currently have an advance directive, [facility] encourages you to create one.  For more information call:  [phone]), [phone] ([phone]),  or log on to [email or web address].        Language Assistance Services     ATTENTION: Language assistance services are available, free of charge. Please call [phone].      ATENCIÓN: Si habla español, tiene a dalal disposición servicios gratuitos de asistencia lingüística. Llame al [phone].     CHÚ Ý: N?u b?n nói Ti?ng Vi?t, có các d?ch v? h? tr? ngôn ng? mi?n phí dành cho b?n. G?i s? [phone].        Stroke Education              [facility] Sign-Up     Activating your [facility] account is as easy as 1-2-3!     1) Visit [email or web address], select Sign Up Now, enter this activation code and your date of birth, then select Next.  [email or web address]  Expires: 5/19/2017 11:42 AM      2) Create a username and password to use when you visit [facility] in the future and select a security question in case you lose your password and select Next.    3) Enter your e-mail address and click Sign Up!    Additional Information  If you have questions, please e-mail [email or web address] or call [phone] to talk to our [facility] staff. Remember,  [facility] is NOT to be used for urgent needs. For medical emergencies, dial 911.          [facility] complies with applicable Federal civil rights laws and does not discriminate on the basis of race, color, national origin, age, disability, or sex.

## 2017-05-18 NOTE — OP NOTE
Patient Name: Shira De Leon  MRN: [id number]    INFORMED CONSENT: The procedure, risks, benefits and options were discussed with patient. There are no contraindications to the procedure. The patient expressed understanding and agreed to proceed. The personnel performing the procedure was discussed. I verify that I personally obtained Shira's consent prior to the start of the procedure and the signed consent can be found on the patient's chart.      Procedure Date: 5/18/2017  Anesthesia:   systemic  Pre Procedure diagnosis:   Sacroiliitis, trochanteric bursitis   Post-Procedure diagnosis:   Same    Sedation: Yes - Fentanyl 50 mcg and Midazolam 2 mg    PROCEDURE: BILATERAL  SACROILIAC JOINT INJECTION  DESCRIPTION OF PROCEDURE: The patient was brought to the fluoroscopy suite.  IV access was obtained prior to the procedure. The patient was positioned prone on the fluoroscopy table. Continuous hemodynamic monitoring was initiated including blood pressure, EKG, and pulse oximetry.  The lumbosacral area was prepped with chlorhexidine three times and draped into a sterile field. The skin overlying the   sacroiliac joint was anesthetized using 5 cc of lidocaine 1%. The inferior pole of the sacroiliac joint was identified by cephalo-oblique fluoroscopy. A 22 gauge, 3 1/2 inch spinal needle was slowly advanced through the sacroiliac joint capsule under fluoroscopic guidance. The needle position was confirmed using oblique, AP and lateral fluoroscopic imaging.  Negative aspiration was confirmed. 5 cc of Omnipaque 300 was injected confirming intra-articular contrast spread. A combination of 5 cc of Bupivacaine 0.25% and 20 mg kenalog was easily injected on each side . The needle was removed and bleeding was nil.  A sterile dressing was applied. PATIENT was taken to the Post-block Recovery Area for further observation.        PROCEDURE: BILATERAL  GREATER TROCHANTERIC BURSA INJECTION      DESCRIPTION OF PROCEDURE: The patient was  "brought to the procedure room.  IV access was obtained prior to the procedure. The patient was positioned RPONE on the fluoroscopy table. Continuous hemodynamic monitoring was initiated including blood pressure, EKG, and pulse oximetry. The skin overlying the greater trochanter was prepped with chlrhexidine three times and draped in a sterile fashion. The skin and subcutaneous tissue was anesthetized using 5 cc of lidocaine 1%. A  22 gauge 3.5" spinal needle was slowly advanced through under fluoroscopic guidance into the greater trochanteric bursa. The needle position was confirmed using oblique, AP and lateral fluoroscopic imaging. Negative aspiration was confirmed. 5 cc of Omnipaque 300 was injected confirming appropriate contrast spread. A combination of 5 cc of Bupivacaine 0.25% and 10 mg kenalog was easily injected ONE ACH SIDE . The needle was removed and bleeding was nil. A sterile dressing was applied. Shira was taken back to the recovery room for further observation.       Blood Loss: Nill  Specimen: None    Pre Procedure Pain Level: 10/10  Post-Procedure Pain Level: 5/10        Discharge Diagnosis: Same as Pre and Post Procedure  Condition on Discharge: Stable.  Diet on Discharge: Same as before.  Activity: as per instruction sheet.  Discharge to: Home with a responsible adult.  Follow up: as per Discharge instructions              "

## 2017-05-19 ENCOUNTER — TELEPHONE (OUTPATIENT)
Dept: PAIN MEDICINE | Facility: HOSPITAL | Age: 77
End: 2017-05-19

## 2017-05-22 ENCOUNTER — TELEPHONE (OUTPATIENT)
Dept: PAIN MEDICINE | Facility: CLINIC | Age: 77
End: 2017-05-22

## 2017-05-22 NOTE — TELEPHONE ENCOUNTER
----- Message from Audrey Friend sent at 5/22/2017  8:43 AM CDT -----  Contact: Self [phone]  Patient is calling to talk to nurse concerning she is having side affects from her injection and she is concern she said she is having dizzy spells and high blood pressure. Speaks Niuean only

## 2017-05-22 NOTE — TELEPHONE ENCOUNTER
----- Message from Brandie Hay LPN sent at 5/22/2017  2:41 PM CDT -----  Spoke with patient regarding procedure. Patient stated that she had headaches and dizziness a few days after her injection that was on 5/18/17. Patient stated she is still in a lot of pain at this time.      Please advise thank you

## 2017-05-22 NOTE — TELEPHONE ENCOUNTER
Spoke with patient regarding procedure. Patient stated that she had headaches and dizziness a few days after her injection that was on 5/18/17. Patient was informed that a message will be sent to the doctor regarding this matter. Patient verbalized understanding.

## 2017-05-22 NOTE — TELEPHONE ENCOUNTER
Spoke to patient. Pain is completely gone and she feels much better. She feels a bit jittery after injection which might be related to cortisone. Told her that might last for few s as a temporary side effect and if she still has this after 1 week to call me back.

## 2017-05-30 ENCOUNTER — HOSPITAL ENCOUNTER (INPATIENT)
Facility: HOSPITAL | Age: 77
LOS: 1 days | Discharge: HOME | DRG: 390 | End: 2017-05-31
Attending: EMERGENCY MEDICINE | Admitting: SURGERY
Payer: MEDICARE

## 2017-05-30 DIAGNOSIS — R10.9 ABDOMINAL PAIN: ICD-10-CM

## 2017-05-30 DIAGNOSIS — K56.609 SBO (SMALL BOWEL OBSTRUCTION): Primary | ICD-10-CM

## 2017-05-30 DIAGNOSIS — K56.50 SMALL BOWEL OBSTRUCTION DUE TO ADHESIONS: ICD-10-CM

## 2017-05-30 DIAGNOSIS — R10.84 GENERALIZED ABDOMINAL PAIN: ICD-10-CM

## 2017-05-30 LAB
ALBUMIN SERPL BCP-MCNC: 4.2 G/DL
ALP SERPL-CCNC: 84 U/L
ALT SERPL W/O P-5'-P-CCNC: 16 U/L
AMYLASE SERPL-CCNC: 58 U/L
ANION GAP SERPL CALC-SCNC: 13 MMOL/L
AST SERPL-CCNC: 16 U/L
BACTERIA #/AREA URNS HPF: ABNORMAL /HPF
BASOPHILS # BLD AUTO: 0.03 K/UL
BASOPHILS NFR BLD: 0.1 %
BILIRUB SERPL-MCNC: 0.3 MG/DL
BILIRUB UR QL STRIP: NEGATIVE
BUN SERPL-MCNC: 16 MG/DL
CALCIUM SERPL-MCNC: 9.6 MG/DL
CHLORIDE SERPL-SCNC: 101 MMOL/L
CLARITY UR: CLEAR
CO2 SERPL-SCNC: 28 MMOL/L
COLOR UR: YELLOW
CREAT SERPL-MCNC: 0.7 MG/DL
DIFFERENTIAL METHOD: ABNORMAL
EOSINOPHIL # BLD AUTO: 0.1 K/UL
EOSINOPHIL NFR BLD: 0.3 %
ERYTHROCYTE [DISTWIDTH] IN BLOOD BY AUTOMATED COUNT: 15.4 %
EST. GFR  (AFRICAN AMERICAN): >60 ML/MIN/1.73 M^2
EST. GFR  (NON AFRICAN AMERICAN): >60 ML/MIN/1.73 M^2
GLUCOSE SERPL-MCNC: 113 MG/DL
GLUCOSE UR QL STRIP: NEGATIVE
HCT VFR BLD AUTO: 44.6 %
HGB BLD-MCNC: 15 G/DL
HGB UR QL STRIP: ABNORMAL
KETONES UR QL STRIP: ABNORMAL
LEUKOCYTE ESTERASE UR QL STRIP: NEGATIVE
LIPASE SERPL-CCNC: 9 U/L
LYMPHOCYTES # BLD AUTO: 1.5 K/UL
LYMPHOCYTES NFR BLD: 7.3 %
MAGNESIUM SERPL-MCNC: 2.4 MG/DL
MCH RBC QN AUTO: 30.3 PG
MCHC RBC AUTO-ENTMCNC: 33.6 %
MCV RBC AUTO: 90 FL
MICROSCOPIC COMMENT: ABNORMAL
MONOCYTES # BLD AUTO: 1.3 K/UL
MONOCYTES NFR BLD: 6.1 %
NEUTROPHILS # BLD AUTO: 18 K/UL
NEUTROPHILS NFR BLD: 85.9 %
NITRITE UR QL STRIP: NEGATIVE
PH UR STRIP: 7 [PH] (ref 5–8)
PLATELET # BLD AUTO: 466 K/UL
PMV BLD AUTO: 9.2 FL
POTASSIUM SERPL-SCNC: 4.6 MMOL/L
PROT SERPL-MCNC: 7.2 G/DL
PROT UR QL STRIP: NEGATIVE
RBC # BLD AUTO: 4.95 M/UL
RBC #/AREA URNS HPF: 25 /HPF (ref 0–4)
SODIUM SERPL-SCNC: 142 MMOL/L
SP GR UR STRIP: 1.01 (ref 1–1.03)
SQUAMOUS #/AREA URNS HPF: ABNORMAL /HPF
TROPONIN I SERPL DL<=0.01 NG/ML-MCNC: <0.006 NG/ML
URN SPEC COLLECT METH UR: ABNORMAL
UROBILINOGEN UR STRIP-ACNC: NEGATIVE EU/DL
WBC # BLD AUTO: 21.04 K/UL
WBC #/AREA URNS HPF: 0 /HPF (ref 0–5)

## 2017-05-30 PROCEDURE — 96374 THER/PROPH/DIAG INJ IV PUSH: CPT

## 2017-05-30 PROCEDURE — 84484 ASSAY OF TROPONIN QUANT: CPT

## 2017-05-30 PROCEDURE — 96376 TX/PRO/DX INJ SAME DRUG ADON: CPT

## 2017-05-30 PROCEDURE — 85025 COMPLETE CBC W/AUTO DIFF WBC: CPT

## 2017-05-30 PROCEDURE — 83735 ASSAY OF MAGNESIUM: CPT

## 2017-05-30 PROCEDURE — 96375 TX/PRO/DX INJ NEW DRUG ADDON: CPT

## 2017-05-30 PROCEDURE — 63600175 PHARM REV CODE 636 W HCPCS: Performed by: STUDENT IN AN ORGANIZED HEALTH CARE EDUCATION/TRAINING PROGRAM

## 2017-05-30 PROCEDURE — 83690 ASSAY OF LIPASE: CPT

## 2017-05-30 PROCEDURE — 81000 URINALYSIS NONAUTO W/SCOPE: CPT

## 2017-05-30 PROCEDURE — 80053 COMPREHEN METABOLIC PANEL: CPT

## 2017-05-30 PROCEDURE — 11000001 HC ACUTE MED/SURG PRIVATE ROOM

## 2017-05-30 PROCEDURE — 82150 ASSAY OF AMYLASE: CPT

## 2017-05-30 PROCEDURE — 99285 EMERGENCY DEPT VISIT HI MDM: CPT | Mod: 25

## 2017-05-30 PROCEDURE — 63600175 PHARM REV CODE 636 W HCPCS: Performed by: EMERGENCY MEDICINE

## 2017-05-30 PROCEDURE — 25000003 PHARM REV CODE 250: Performed by: EMERGENCY MEDICINE

## 2017-05-30 PROCEDURE — 96361 HYDRATE IV INFUSION ADD-ON: CPT

## 2017-05-30 PROCEDURE — 25000003 PHARM REV CODE 250: Performed by: STUDENT IN AN ORGANIZED HEALTH CARE EDUCATION/TRAINING PROGRAM

## 2017-05-30 PROCEDURE — 93005 ELECTROCARDIOGRAM TRACING: CPT

## 2017-05-30 PROCEDURE — 25500020 PHARM REV CODE 255: Performed by: EMERGENCY MEDICINE

## 2017-05-30 RX ORDER — HYDRALAZINE HYDROCHLORIDE 20 MG/ML
5 INJECTION INTRAMUSCULAR; INTRAVENOUS EVERY 4 HOURS PRN
Status: DISCONTINUED | OUTPATIENT
Start: 2017-05-30 | End: 2017-05-31 | Stop reason: HOSPADM

## 2017-05-30 RX ORDER — DIPHENHYDRAMINE HYDROCHLORIDE 50 MG/ML
25 INJECTION INTRAMUSCULAR; INTRAVENOUS EVERY 4 HOURS PRN
Status: DISCONTINUED | OUTPATIENT
Start: 2017-05-30 | End: 2017-05-31 | Stop reason: HOSPADM

## 2017-05-30 RX ORDER — RAMELTEON 8 MG/1
8 TABLET ORAL NIGHTLY PRN
Status: DISCONTINUED | OUTPATIENT
Start: 2017-05-30 | End: 2017-05-31 | Stop reason: HOSPADM

## 2017-05-30 RX ORDER — ACETAMINOPHEN 325 MG/1
650 TABLET ORAL EVERY 8 HOURS PRN
Status: DISCONTINUED | OUTPATIENT
Start: 2017-05-30 | End: 2017-05-31 | Stop reason: HOSPADM

## 2017-05-30 RX ORDER — SODIUM CHLORIDE 9 MG/ML
1000 INJECTION, SOLUTION INTRAVENOUS
Status: COMPLETED | OUTPATIENT
Start: 2017-05-30 | End: 2017-05-30

## 2017-05-30 RX ORDER — ONDANSETRON 8 MG/1
8 TABLET, ORALLY DISINTEGRATING ORAL EVERY 8 HOURS PRN
Status: DISCONTINUED | OUTPATIENT
Start: 2017-05-30 | End: 2017-05-31 | Stop reason: HOSPADM

## 2017-05-30 RX ORDER — HYDROMORPHONE HYDROCHLORIDE 1 MG/ML
0.5 INJECTION, SOLUTION INTRAMUSCULAR; INTRAVENOUS; SUBCUTANEOUS
Status: COMPLETED | OUTPATIENT
Start: 2017-05-30 | End: 2017-05-30

## 2017-05-30 RX ORDER — SODIUM CHLORIDE 0.9 % (FLUSH) 0.9 %
3 SYRINGE (ML) INJECTION EVERY 8 HOURS
Status: DISCONTINUED | OUTPATIENT
Start: 2017-05-30 | End: 2017-05-31 | Stop reason: HOSPADM

## 2017-05-30 RX ORDER — DEXTROSE MONOHYDRATE, SODIUM CHLORIDE, AND POTASSIUM CHLORIDE 50; 1.49; 4.5 G/1000ML; G/1000ML; G/1000ML
INJECTION, SOLUTION INTRAVENOUS CONTINUOUS
Status: DISCONTINUED | OUTPATIENT
Start: 2017-05-30 | End: 2017-05-31 | Stop reason: HOSPADM

## 2017-05-30 RX ORDER — MORPHINE SULFATE 2 MG/ML
1 INJECTION, SOLUTION INTRAMUSCULAR; INTRAVENOUS EVERY 4 HOURS PRN
Status: DISCONTINUED | OUTPATIENT
Start: 2017-05-30 | End: 2017-05-31 | Stop reason: HOSPADM

## 2017-05-30 RX ORDER — ONDANSETRON 2 MG/ML
4 INJECTION INTRAMUSCULAR; INTRAVENOUS
Status: COMPLETED | OUTPATIENT
Start: 2017-05-30 | End: 2017-05-30

## 2017-05-30 RX ORDER — ACETAMINOPHEN 650 MG/1
650 SUPPOSITORY RECTAL EVERY 4 HOURS PRN
Status: DISCONTINUED | OUTPATIENT
Start: 2017-05-30 | End: 2017-05-31 | Stop reason: HOSPADM

## 2017-05-30 RX ORDER — ONDANSETRON 2 MG/ML
4 INJECTION INTRAMUSCULAR; INTRAVENOUS EVERY 6 HOURS PRN
Status: DISCONTINUED | OUTPATIENT
Start: 2017-05-30 | End: 2017-05-31 | Stop reason: HOSPADM

## 2017-05-30 RX ADMIN — IOHEXOL 75 ML: 350 INJECTION, SOLUTION INTRAVENOUS at 08:05

## 2017-05-30 RX ADMIN — SODIUM CHLORIDE, PRESERVATIVE FREE 3 ML: 5 INJECTION INTRAVENOUS at 10:05

## 2017-05-30 RX ADMIN — SODIUM CHLORIDE 1000 ML: 0.9 INJECTION, SOLUTION INTRAVENOUS at 07:05

## 2017-05-30 RX ADMIN — HYDROMORPHONE HYDROCHLORIDE 0.5 MG: 1 INJECTION, SOLUTION INTRAMUSCULAR; INTRAVENOUS; SUBCUTANEOUS at 07:05

## 2017-05-30 RX ADMIN — MORPHINE SULFATE 1 MG: 2 INJECTION, SOLUTION INTRAMUSCULAR; INTRAVENOUS at 10:05

## 2017-05-30 RX ADMIN — HYDROMORPHONE HYDROCHLORIDE 0.5 MG: 1 INJECTION, SOLUTION INTRAMUSCULAR; INTRAVENOUS; SUBCUTANEOUS at 09:05

## 2017-05-30 RX ADMIN — ONDANSETRON 4 MG: 2 INJECTION INTRAMUSCULAR; INTRAVENOUS at 07:05

## 2017-05-30 RX ADMIN — SODIUM CHLORIDE 1000 ML: 0.9 INJECTION, SOLUTION INTRAVENOUS at 09:05

## 2017-05-30 RX ADMIN — DEXTROSE MONOHYDRATE, SODIUM CHLORIDE, AND POTASSIUM CHLORIDE: 50; 4.5; 1.49 INJECTION, SOLUTION INTRAVENOUS at 10:05

## 2017-05-31 VITALS
OXYGEN SATURATION: 97 % | RESPIRATION RATE: 18 BRPM | TEMPERATURE: 98 F | HEART RATE: 84 BPM | DIASTOLIC BLOOD PRESSURE: 74 MMHG | BODY MASS INDEX: 21.52 KG/M2 | SYSTOLIC BLOOD PRESSURE: 160 MMHG | WEIGHT: 114 LBS | HEIGHT: 61 IN

## 2017-05-31 LAB
ANION GAP SERPL CALC-SCNC: 6 MMOL/L
BASOPHILS # BLD AUTO: 0.02 K/UL
BASOPHILS NFR BLD: 0.2 %
BUN SERPL-MCNC: 8 MG/DL
CALCIUM SERPL-MCNC: 8 MG/DL
CHLORIDE SERPL-SCNC: 107 MMOL/L
CO2 SERPL-SCNC: 27 MMOL/L
CREAT SERPL-MCNC: 0.5 MG/DL
DIFFERENTIAL METHOD: ABNORMAL
EOSINOPHIL # BLD AUTO: 0.1 K/UL
EOSINOPHIL NFR BLD: 0.7 %
ERYTHROCYTE [DISTWIDTH] IN BLOOD BY AUTOMATED COUNT: 15.8 %
EST. GFR  (AFRICAN AMERICAN): >60 ML/MIN/1.73 M^2
EST. GFR  (NON AFRICAN AMERICAN): >60 ML/MIN/1.73 M^2
GLUCOSE SERPL-MCNC: 124 MG/DL
HCT VFR BLD AUTO: 39.2 %
HGB BLD-MCNC: 13 G/DL
LYMPHOCYTES # BLD AUTO: 1.2 K/UL
LYMPHOCYTES NFR BLD: 10.8 %
MAGNESIUM SERPL-MCNC: 2 MG/DL
MCH RBC QN AUTO: 30.2 PG
MCHC RBC AUTO-ENTMCNC: 33.2 %
MCV RBC AUTO: 91 FL
MONOCYTES # BLD AUTO: 0.9 K/UL
MONOCYTES NFR BLD: 8.3 %
NEUTROPHILS # BLD AUTO: 8.9 K/UL
NEUTROPHILS NFR BLD: 79.7 %
PHOSPHATE SERPL-MCNC: 2.5 MG/DL
PLATELET # BLD AUTO: 396 K/UL
PMV BLD AUTO: 8.9 FL
POTASSIUM SERPL-SCNC: 3.4 MMOL/L
RBC # BLD AUTO: 4.3 M/UL
SODIUM SERPL-SCNC: 140 MMOL/L
WBC # BLD AUTO: 11.12 K/UL

## 2017-05-31 PROCEDURE — 80048 BASIC METABOLIC PNL TOTAL CA: CPT

## 2017-05-31 PROCEDURE — 84100 ASSAY OF PHOSPHORUS: CPT

## 2017-05-31 PROCEDURE — 85025 COMPLETE CBC W/AUTO DIFF WBC: CPT

## 2017-05-31 PROCEDURE — 83735 ASSAY OF MAGNESIUM: CPT

## 2017-05-31 PROCEDURE — 25500020 PHARM REV CODE 255: Performed by: STUDENT IN AN ORGANIZED HEALTH CARE EDUCATION/TRAINING PROGRAM

## 2017-05-31 PROCEDURE — 63600175 PHARM REV CODE 636 W HCPCS: Performed by: STUDENT IN AN ORGANIZED HEALTH CARE EDUCATION/TRAINING PROGRAM

## 2017-05-31 PROCEDURE — 63600175 PHARM REV CODE 636 W HCPCS: Performed by: SURGERY

## 2017-05-31 PROCEDURE — 36415 COLL VENOUS BLD VENIPUNCTURE: CPT

## 2017-05-31 PROCEDURE — 25000003 PHARM REV CODE 250: Performed by: STUDENT IN AN ORGANIZED HEALTH CARE EDUCATION/TRAINING PROGRAM

## 2017-05-31 PROCEDURE — 94761 N-INVAS EAR/PLS OXIMETRY MLT: CPT

## 2017-05-31 RX ORDER — POTASSIUM CHLORIDE 7.45 MG/ML
10 INJECTION INTRAVENOUS ONCE
Status: DISCONTINUED | OUTPATIENT
Start: 2017-05-31 | End: 2017-05-31

## 2017-05-31 RX ORDER — POTASSIUM CHLORIDE 7.45 MG/ML
10 INJECTION INTRAVENOUS
Status: DISPENSED | OUTPATIENT
Start: 2017-05-31 | End: 2017-05-31

## 2017-05-31 RX ADMIN — POTASSIUM CHLORIDE 10 MEQ: 10 INJECTION, SOLUTION INTRAVENOUS at 03:05

## 2017-05-31 RX ADMIN — DEXTROSE MONOHYDRATE, SODIUM CHLORIDE, AND POTASSIUM CHLORIDE: 50; 4.5; 1.49 INJECTION, SOLUTION INTRAVENOUS at 06:05

## 2017-05-31 RX ADMIN — POTASSIUM CHLORIDE 10 MEQ: 10 INJECTION, SOLUTION INTRAVENOUS at 12:05

## 2017-05-31 RX ADMIN — DIATRIZOATE MEGLUMINE AND DIATRIZOATE SODIUM 200 ML: 660; 100 LIQUID ORAL; RECTAL at 04:05

## 2017-05-31 RX ADMIN — CALCIUM GLUCONATE 2 G: 94 INJECTION, SOLUTION INTRAVENOUS at 10:05

## 2017-05-31 RX ADMIN — POTASSIUM CHLORIDE 10 MEQ: 10 INJECTION, SOLUTION INTRAVENOUS at 10:05

## 2017-05-31 RX ADMIN — SODIUM CHLORIDE, PRESERVATIVE FREE 3 ML: 5 INJECTION INTRAVENOUS at 06:05

## 2017-05-31 RX ADMIN — SODIUM PHOSPHATE, MONOBASIC, MONOHYDRATE AND SODIUM PHOSPHATE, DIBASIC, ANHYDROUS 30 MMOL: 276; 142 INJECTION, SOLUTION INTRAVENOUS at 11:05

## 2017-05-31 NOTE — PLAN OF CARE
Problem: Patient Care Overview  Goal: Plan of Care Review  Room air SpO2   95%. Pt with no apparent distess noted. Will continue to monitor.

## 2017-05-31 NOTE — NURSING
All 4 Potassium riders were administered although at a slower rate as pt could not tolerate . Tolerating clear liquids without N/V.Ambulating freely without difficulty. Denies any pain..

## 2017-05-31 NOTE — PLAN OF CARE
Problem: Patient Care Overview  Goal: Plan of Care Review  Outcome: Ongoing (interventions implemented as appropriate)  Pt on RA, SPO2  95%.  Pt in no apparent respiratory distress. Will continue to monitor.

## 2017-05-31 NOTE — PROGRESS NOTES
.Pharmacy New Medication Education    Patient accepted medication education.    Pharmacy educated patient on the following medications, using the teach-back method.   Apap  Calcium gluconate  Benadryl  Hydralazine  Morphine  Zofran  Phenergan  Ramelteon  Sodium phos  Learners of pharmacy medication education included:  patient    Patient +/- learner response:  verbalize understanding
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
## 2017-05-31 NOTE — PLAN OF CARE
Problem: Patient Care Overview  Goal: Plan of Care Review  Sats 95% RA; tolerating well; will continue to monitor.

## 2017-05-31 NOTE — DISCHARGE SUMMARY
[facility]  General Surgery  Discharge Summary      Patient Name: Shira De Leon  MRN: [id number]  Admission Date: 5/30/2017  Hospital Length of Stay: 1 days  Discharge Date and Time:  05/31/2017 6:28 PM  Attending Physician: YADI Anton MD   Discharging Provider: Arianne Salcedo MD  Primary Care Provider: Venkat Epstein MD     HPI: 77 y.o. female admitted with SBO    * No surgery found *     Hospital Course: patient has had previous SBO admits. NGT inserted w minimal output. On HD 2, gastroview given per NG, with near immediate BMs. Abd XR 8 hours after gastroview showed contrast in the colon. Started on CLD, abd soft, cont BMs.  Pt's stepdaughter is nurse Kalie and will take her home to cont CLD and slow return to regular diet.    Consults:     Significant Diagnostic Studies: Labs:   BMP:   Recent Labs  Lab 05/30/17  1852 05/31/17  0547   * 124*    140   K 4.6 3.4*    107   CO2 28 27   BUN 16 8   CREATININE 0.7 0.5   CALCIUM 9.6 8.0*   MG 2.4 2.0    and CBC   Recent Labs  Lab 05/30/17  1852 05/31/17  0547   WBC 21.04* 11.12   HGB 15.0 13.0   HCT 44.6 39.2   * 396*     Radiology: X-Ray: KUB: X-Ray Abdomen AP 1 View (KUB):   Results for orders placed or performed during the hospital encounter of 05/30/17   X-Ray Abdomen AP 1 View    Narrative    AP abdomen single view.  Comparison: CT 5/30/17.    Nonspecific bowel gas pattern.  Residual contrast visualized throughout the colon without evidence to suggest small bowel obstruction, noting persistent prominent small bowel loop seen within the left mid abdomen.  No free air identified.    Impression    No evidence of small bowel obstruction.      Electronically signed by: ALEXIS THOMAS MD  Date:     05/31/17  Time:    16:44        Pending Diagnostic Studies:     None        Final Active Diagnoses:    Diagnosis Date Noted POA    PRINCIPAL PROBLEM:  SBO (small bowel obstruction) [K56.69] 05/30/2017 Yes    Abdominal pain  [R10.9]  Unknown      Problems Resolved During this Admission:    Diagnosis Date Noted Date Resolved POA      Discharged Condition: good    Disposition: Home or Self Care    Follow Up:  Follow-up Information     J Adriano Anton MD In 1 month.    Specialty:  General Surgery  Contact information:  [address]  [address]  [place] [address]  [phone]                 Patient Instructions:     Diet general   Scheduling Instructions: Start with clear liquids, advance slowly     Activity as tolerated     Call MD for:  persistent nausea and vomiting or diarrhea     No dressing needed       Medications:  Reconciled Home Medications:   Current Discharge Medication List      CONTINUE these medications which have NOT CHANGED    Details   amlodipine (NORVASC) 10 MG tablet Take 1 tablet (10 mg total) by mouth once daily.  Qty: 90 tablet, Refills: 3    Associated Diagnoses: Essential hypertension             Arianne Salcedo MD  General Surgery  [facility]

## 2017-05-31 NOTE — PLAN OF CARE
TN met with patient. Patient here with abdominal pain/SBO. Patient is independent, and lives with her  at home. Patient has a walker at home, and still drives. Patient does not have any HH. No current needs at this time, will continue to follow.     Spouse-Moises Jung-[phone]       05/31/17 1418   Discharge Assessment   Assessment Type Discharge Planning Assessment   Confirmed/corrected address and phone number on facesheet? Yes   Assessment information obtained from? Patient   Type of Healthcare Directive Received Living will   If Healthcare Directive is received, is it scanned into Epic? yes   Prior to hospitilization cognitive status: Alert/Oriented   Prior to hospitalization functional status: Assistive Equipment   Current cognitive status: Alert/Oriented   Current Functional Status: Assistive Equipment   Arrived From home or self-care   Lives With spouse   Able to Return to Prior Arrangements yes   Is patient able to care for self after discharge? Yes   How many people do you have in your home that can help with your care after discharge? 1   Who are your caregiver(s) and their phone number(s)? Spouse-Moises Jung: [phone]   Patient's perception of discharge disposition home or selfcare   Readmission Within The Last 30 Days no previous admission in last 30 days   Patient currently being followed by outpatient case management? No   Patient currently receives home health services? No   Does the patient currently use HME? Yes   Patient currently receives private duty nursing? No   Patient currently receives any other outside agency services? No   Equipment Currently Used at Home walker, standard   Do you have any problems affording any of your prescribed medications? No   Is the patient taking medications as prescribed? yes   Do you have any financial concerns preventing you from receiving the healthcare you need? No   Does the patient have transportation to healthcare appointments? Yes    Transportation Available car;family or friend will provide   On Dialysis? No   Does the patient receive services at the [facility]mad[facility] Clinic? No   Are there any open cases? No   Discharge Plan A Home with family   Discharge Plan B Home with family;Home Health   Patient/Family In Agreement With Plan yes     Adriana Ray RN  Transition Navigator  [phone]

## 2017-05-31 NOTE — ED NOTES
Pt reports epigastric pain with nausea since 1100. Denies CP, SOB, vomiting, and dizziness. Pt reports hx of esophageal stricture requiring dilation in the past. AAO, VSS. C/o pain 10/10.    APPEARANCE: Alert, oriented and in no acute distress.  CARDIAC: Normal rate and rhythm.   PERIPHERAL VASCULAR: peripheral pulses present. Normal cap refill. No edema. Warm to touch.    RESPIRATORY:Normal rate and effort. Respirations are equal and unlabored no obvious signs of distress.  GASTRO: soft, no tenderness, no abdominal distention. +epigastric pain. +nausea.  MUSC: Full ROM. No bony tenderness or soft tissue tenderness. No obvious deformity.  SKIN: Skin is warm and dry, normal skin turgor.  NEURO: 5/5 strength major flexors/extensors bilaterally.  Amador coma scale: eyes open spontaneously-4, oriented & converses-5, obeys commands-6. No neurological abnormalities.   MENTAL STATUS: awake, alert and aware of environment.
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
## 2017-06-05 PROBLEM — R10.9 ABDOMINAL PAIN: Status: ACTIVE | Noted: 2017-06-05
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
## 2017-06-13 ENCOUNTER — OFFICE VISIT (OUTPATIENT)
Dept: PAIN MEDICINE | Facility: CLINIC | Age: 77
End: 2017-06-13
Payer: MEDICARE

## 2017-06-13 VITALS
WEIGHT: 110 LBS | HEART RATE: 64 BPM | SYSTOLIC BLOOD PRESSURE: 116 MMHG | DIASTOLIC BLOOD PRESSURE: 74 MMHG | BODY MASS INDEX: 20.78 KG/M2

## 2017-06-13 DIAGNOSIS — M46.1 SACROILIITIS: ICD-10-CM

## 2017-06-13 DIAGNOSIS — M70.61 TROCHANTERIC BURSITIS OF BOTH HIPS: ICD-10-CM

## 2017-06-13 DIAGNOSIS — M70.62 TROCHANTERIC BURSITIS OF BOTH HIPS: ICD-10-CM

## 2017-06-13 DIAGNOSIS — M70.51 PES ANSERINUS BURSITIS OF BOTH KNEES: Primary | ICD-10-CM

## 2017-06-13 DIAGNOSIS — M70.52 PES ANSERINUS BURSITIS OF BOTH KNEES: Primary | ICD-10-CM

## 2017-06-13 PROCEDURE — 99213 OFFICE O/P EST LOW 20 MIN: CPT | Mod: S$GLB,,, | Performed by: ANESTHESIOLOGY

## 2017-06-13 PROCEDURE — 1159F MED LIST DOCD IN RCRD: CPT | Mod: S$GLB,,, | Performed by: ANESTHESIOLOGY

## 2017-06-13 PROCEDURE — 1126F AMNT PAIN NOTED NONE PRSNT: CPT | Mod: S$GLB,,, | Performed by: ANESTHESIOLOGY

## 2017-06-13 PROCEDURE — 1157F ADVNC CARE PLAN IN RCRD: CPT | Mod: S$GLB,,, | Performed by: ANESTHESIOLOGY

## 2017-06-13 PROCEDURE — 99999 PR PBB SHADOW E&M-EST. PATIENT-LVL II: CPT | Mod: PBBFAC,,, | Performed by: ANESTHESIOLOGY

## 2017-06-13 RX ORDER — MELOXICAM 7.5 MG/1
TABLET ORAL
COMMUNITY
Start: 2017-04-05 | End: 2022-02-16

## 2017-06-24 ENCOUNTER — HOSPITAL ENCOUNTER (INPATIENT)
Facility: HOSPITAL | Age: 77
LOS: 1 days | Discharge: HOME | DRG: 390 | End: 2017-06-25
Attending: EMERGENCY MEDICINE | Admitting: EMERGENCY MEDICINE
Payer: MEDICARE

## 2017-06-24 DIAGNOSIS — I10 ESSENTIAL HYPERTENSION: Chronic | ICD-10-CM

## 2017-06-24 DIAGNOSIS — M81.0 OSTEOPOROSIS, UNSPECIFIED OSTEOPOROSIS TYPE, UNSPECIFIED PATHOLOGICAL FRACTURE PRESENCE: ICD-10-CM

## 2017-06-24 DIAGNOSIS — Z46.59 ENCOUNTER FOR NASOGASTRIC (NG) TUBE PLACEMENT: ICD-10-CM

## 2017-06-24 DIAGNOSIS — M19.90 ARTHRITIS: ICD-10-CM

## 2017-06-24 DIAGNOSIS — K56.609 SMALL BOWEL OBSTRUCTION: Primary | ICD-10-CM

## 2017-06-24 DIAGNOSIS — R10.9 ABDOMINAL PAIN: ICD-10-CM

## 2017-06-24 DIAGNOSIS — K56.609 SBO (SMALL BOWEL OBSTRUCTION): ICD-10-CM

## 2017-06-24 DIAGNOSIS — I73.9 PVD (PERIPHERAL VASCULAR DISEASE): ICD-10-CM

## 2017-06-24 LAB
ALBUMIN SERPL BCP-MCNC: 4.3 G/DL
ALP SERPL-CCNC: 94 U/L
ALT SERPL W/O P-5'-P-CCNC: 17 U/L
ANION GAP SERPL CALC-SCNC: 10 MMOL/L
AST SERPL-CCNC: 18 U/L
BASOPHILS # BLD AUTO: 0.03 K/UL
BASOPHILS NFR BLD: 0.2 %
BILIRUB SERPL-MCNC: 0.4 MG/DL
BILIRUB UR QL STRIP: NEGATIVE
BUN SERPL-MCNC: 13 MG/DL
CALCIUM SERPL-MCNC: 9.8 MG/DL
CHLORIDE SERPL-SCNC: 100 MMOL/L
CLARITY UR: CLEAR
CO2 SERPL-SCNC: 28 MMOL/L
COLOR UR: YELLOW
CREAT SERPL-MCNC: 0.7 MG/DL
DIFFERENTIAL METHOD: ABNORMAL
EOSINOPHIL # BLD AUTO: 0.1 K/UL
EOSINOPHIL NFR BLD: 0.5 %
ERYTHROCYTE [DISTWIDTH] IN BLOOD BY AUTOMATED COUNT: 15.1 %
EST. GFR  (AFRICAN AMERICAN): >60 ML/MIN/1.73 M^2
EST. GFR  (NON AFRICAN AMERICAN): >60 ML/MIN/1.73 M^2
GLUCOSE SERPL-MCNC: 115 MG/DL
GLUCOSE UR QL STRIP: NEGATIVE
HCT VFR BLD AUTO: 45.2 %
HGB BLD-MCNC: 15.4 G/DL
HGB UR QL STRIP: ABNORMAL
KETONES UR QL STRIP: NEGATIVE
LEUKOCYTE ESTERASE UR QL STRIP: NEGATIVE
LIPASE SERPL-CCNC: 9 U/L
LYMPHOCYTES # BLD AUTO: 1.5 K/UL
LYMPHOCYTES NFR BLD: 9.9 %
MCH RBC QN AUTO: 30.6 PG
MCHC RBC AUTO-ENTMCNC: 34.1 %
MCV RBC AUTO: 90 FL
MICROSCOPIC COMMENT: ABNORMAL
MONOCYTES # BLD AUTO: 0.9 K/UL
MONOCYTES NFR BLD: 5.7 %
NEUTROPHILS # BLD AUTO: 12.9 K/UL
NEUTROPHILS NFR BLD: 83.5 %
NITRITE UR QL STRIP: NEGATIVE
PH UR STRIP: 7 [PH] (ref 5–8)
PLATELET # BLD AUTO: 423 K/UL
PMV BLD AUTO: 9.3 FL
POTASSIUM SERPL-SCNC: 4.1 MMOL/L
PROT SERPL-MCNC: 7.5 G/DL
PROT UR QL STRIP: NEGATIVE
RBC # BLD AUTO: 5.04 M/UL
RBC #/AREA URNS HPF: 10 /HPF (ref 0–4)
SODIUM SERPL-SCNC: 138 MMOL/L
SP GR UR STRIP: <=1.005 (ref 1–1.03)
URN SPEC COLLECT METH UR: ABNORMAL
UROBILINOGEN UR STRIP-ACNC: NEGATIVE EU/DL
WBC # BLD AUTO: 15.38 K/UL
WBC #/AREA URNS HPF: 5 /HPF (ref 0–5)

## 2017-06-24 PROCEDURE — 96375 TX/PRO/DX INJ NEW DRUG ADDON: CPT

## 2017-06-24 PROCEDURE — 99285 EMERGENCY DEPT VISIT HI MDM: CPT | Mod: 25

## 2017-06-24 PROCEDURE — 25000003 PHARM REV CODE 250: Performed by: EMERGENCY MEDICINE

## 2017-06-24 PROCEDURE — 80053 COMPREHEN METABOLIC PANEL: CPT

## 2017-06-24 PROCEDURE — 96361 HYDRATE IV INFUSION ADD-ON: CPT

## 2017-06-24 PROCEDURE — 11000001 HC ACUTE MED/SURG PRIVATE ROOM

## 2017-06-24 PROCEDURE — 85025 COMPLETE CBC W/AUTO DIFF WBC: CPT

## 2017-06-24 PROCEDURE — 83690 ASSAY OF LIPASE: CPT

## 2017-06-24 PROCEDURE — 96374 THER/PROPH/DIAG INJ IV PUSH: CPT

## 2017-06-24 PROCEDURE — 96376 TX/PRO/DX INJ SAME DRUG ADON: CPT

## 2017-06-24 PROCEDURE — 81000 URINALYSIS NONAUTO W/SCOPE: CPT

## 2017-06-24 PROCEDURE — 93005 ELECTROCARDIOGRAM TRACING: CPT

## 2017-06-24 PROCEDURE — 93010 ELECTROCARDIOGRAM REPORT: CPT | Mod: S$GLB,,, | Performed by: INTERNAL MEDICINE

## 2017-06-24 PROCEDURE — 25500020 PHARM REV CODE 255: Performed by: EMERGENCY MEDICINE

## 2017-06-24 PROCEDURE — 63600175 PHARM REV CODE 636 W HCPCS: Performed by: EMERGENCY MEDICINE

## 2017-06-24 RX ORDER — ONDANSETRON 2 MG/ML
4 INJECTION INTRAMUSCULAR; INTRAVENOUS EVERY 12 HOURS PRN
Status: DISCONTINUED | OUTPATIENT
Start: 2017-06-24 | End: 2017-06-25 | Stop reason: HOSPADM

## 2017-06-24 RX ORDER — MORPHINE SULFATE 10 MG/ML
2 INJECTION INTRAMUSCULAR; INTRAVENOUS; SUBCUTANEOUS EVERY 4 HOURS PRN
Status: DISCONTINUED | OUTPATIENT
Start: 2017-06-24 | End: 2017-06-25 | Stop reason: HOSPADM

## 2017-06-24 RX ORDER — METRONIDAZOLE 500 MG/100ML
500 INJECTION, SOLUTION INTRAVENOUS
Status: DISCONTINUED | OUTPATIENT
Start: 2017-06-24 | End: 2017-06-25 | Stop reason: HOSPADM

## 2017-06-24 RX ORDER — CIPROFLOXACIN 2 MG/ML
400 INJECTION, SOLUTION INTRAVENOUS
Status: DISCONTINUED | OUTPATIENT
Start: 2017-06-24 | End: 2017-06-25 | Stop reason: HOSPADM

## 2017-06-24 RX ORDER — FAMOTIDINE 10 MG/ML
20 INJECTION INTRAVENOUS EVERY 12 HOURS
Status: DISCONTINUED | OUTPATIENT
Start: 2017-06-24 | End: 2017-06-25 | Stop reason: HOSPADM

## 2017-06-24 RX ORDER — MORPHINE SULFATE 10 MG/ML
4 INJECTION INTRAMUSCULAR; INTRAVENOUS; SUBCUTANEOUS
Status: COMPLETED | OUTPATIENT
Start: 2017-06-24 | End: 2017-06-24

## 2017-06-24 RX ORDER — ONDANSETRON 2 MG/ML
4 INJECTION INTRAMUSCULAR; INTRAVENOUS
Status: COMPLETED | OUTPATIENT
Start: 2017-06-24 | End: 2017-06-24

## 2017-06-24 RX ORDER — DEXTROSE MONOHYDRATE AND SODIUM CHLORIDE 5; .9 G/100ML; G/100ML
INJECTION, SOLUTION INTRAVENOUS CONTINUOUS
Status: DISCONTINUED | OUTPATIENT
Start: 2017-06-24 | End: 2017-06-25 | Stop reason: HOSPADM

## 2017-06-24 RX ORDER — METOCLOPRAMIDE HYDROCHLORIDE 5 MG/ML
5 INJECTION INTRAMUSCULAR; INTRAVENOUS EVERY 6 HOURS PRN
Status: DISCONTINUED | OUTPATIENT
Start: 2017-06-24 | End: 2017-06-25 | Stop reason: HOSPADM

## 2017-06-24 RX ORDER — AMLODIPINE BESYLATE 5 MG/1
10 TABLET ORAL DAILY
Status: DISCONTINUED | OUTPATIENT
Start: 2017-06-25 | End: 2017-06-25 | Stop reason: HOSPADM

## 2017-06-24 RX ORDER — MORPHINE SULFATE 10 MG/ML
4 INJECTION INTRAMUSCULAR; INTRAVENOUS; SUBCUTANEOUS EVERY 4 HOURS PRN
Status: DISCONTINUED | OUTPATIENT
Start: 2017-06-24 | End: 2017-06-25 | Stop reason: HOSPADM

## 2017-06-24 RX ADMIN — SODIUM CHLORIDE 1000 ML: 0.9 INJECTION, SOLUTION INTRAVENOUS at 06:06

## 2017-06-24 RX ADMIN — MORPHINE SULFATE 4 MG: 10 INJECTION INTRAVENOUS at 09:06

## 2017-06-24 RX ADMIN — DEXTROSE AND SODIUM CHLORIDE: 5; .9 INJECTION, SOLUTION INTRAVENOUS at 11:06

## 2017-06-24 RX ADMIN — MORPHINE SULFATE 4 MG: 10 INJECTION INTRAVENOUS at 06:06

## 2017-06-24 RX ADMIN — IOHEXOL 75 ML: 350 INJECTION, SOLUTION INTRAVENOUS at 08:06

## 2017-06-24 RX ADMIN — ONDANSETRON 4 MG: 2 INJECTION INTRAMUSCULAR; INTRAVENOUS at 06:06

## 2017-06-25 VITALS
BODY MASS INDEX: 20.9 KG/M2 | OXYGEN SATURATION: 93 % | TEMPERATURE: 98 F | WEIGHT: 110.69 LBS | HEART RATE: 89 BPM | HEIGHT: 61 IN | RESPIRATION RATE: 18 BRPM | DIASTOLIC BLOOD PRESSURE: 61 MMHG | SYSTOLIC BLOOD PRESSURE: 122 MMHG

## 2017-06-25 LAB
ALBUMIN SERPL BCP-MCNC: 3.3 G/DL
ALP SERPL-CCNC: 80 U/L
ALT SERPL W/O P-5'-P-CCNC: 13 U/L
ANION GAP SERPL CALC-SCNC: 7 MMOL/L
AST SERPL-CCNC: 14 U/L
BASOPHILS # BLD AUTO: 0.03 K/UL
BASOPHILS NFR BLD: 0.2 %
BILIRUB SERPL-MCNC: 0.4 MG/DL
BUN SERPL-MCNC: 8 MG/DL
CALCIUM SERPL-MCNC: 8.6 MG/DL
CHLORIDE SERPL-SCNC: 107 MMOL/L
CO2 SERPL-SCNC: 28 MMOL/L
CREAT SERPL-MCNC: 0.6 MG/DL
DIFFERENTIAL METHOD: ABNORMAL
EOSINOPHIL # BLD AUTO: 0.1 K/UL
EOSINOPHIL NFR BLD: 0.6 %
ERYTHROCYTE [DISTWIDTH] IN BLOOD BY AUTOMATED COUNT: 15.3 %
EST. GFR  (AFRICAN AMERICAN): >60 ML/MIN/1.73 M^2
EST. GFR  (NON AFRICAN AMERICAN): >60 ML/MIN/1.73 M^2
GLUCOSE SERPL-MCNC: 136 MG/DL
HCT VFR BLD AUTO: 41.9 %
HGB BLD-MCNC: 13.6 G/DL
LYMPHOCYTES # BLD AUTO: 1 K/UL
LYMPHOCYTES NFR BLD: 8.1 %
MCH RBC QN AUTO: 29.9 PG
MCHC RBC AUTO-ENTMCNC: 32.5 %
MCV RBC AUTO: 92 FL
MONOCYTES # BLD AUTO: 1.1 K/UL
MONOCYTES NFR BLD: 8.9 %
NEUTROPHILS # BLD AUTO: 10.3 K/UL
NEUTROPHILS NFR BLD: 82 %
PLATELET # BLD AUTO: 400 K/UL
PMV BLD AUTO: 9.3 FL
POTASSIUM SERPL-SCNC: 3.9 MMOL/L
PROT SERPL-MCNC: 6.1 G/DL
RBC # BLD AUTO: 4.55 M/UL
SODIUM SERPL-SCNC: 142 MMOL/L
WBC # BLD AUTO: 12.53 K/UL

## 2017-06-25 PROCEDURE — 63600175 PHARM REV CODE 636 W HCPCS: Performed by: EMERGENCY MEDICINE

## 2017-06-25 PROCEDURE — 94761 N-INVAS EAR/PLS OXIMETRY MLT: CPT

## 2017-06-25 PROCEDURE — 25000003 PHARM REV CODE 250: Performed by: EMERGENCY MEDICINE

## 2017-06-25 PROCEDURE — 36415 COLL VENOUS BLD VENIPUNCTURE: CPT

## 2017-06-25 PROCEDURE — 85025 COMPLETE CBC W/AUTO DIFF WBC: CPT

## 2017-06-25 PROCEDURE — 80053 COMPREHEN METABOLIC PANEL: CPT

## 2017-06-25 PROCEDURE — 25000003 PHARM REV CODE 250: Performed by: SURGERY

## 2017-06-25 RX ORDER — POLYETHYLENE GLYCOL 3350 17 G/17G
17 POWDER, FOR SOLUTION ORAL ONCE
Status: COMPLETED | OUTPATIENT
Start: 2017-06-25 | End: 2017-06-25

## 2017-06-25 RX ADMIN — ONDANSETRON 4 MG: 2 INJECTION INTRAMUSCULAR; INTRAVENOUS at 03:06

## 2017-06-25 RX ADMIN — AMLODIPINE BESYLATE 10 MG: 5 TABLET ORAL at 09:06

## 2017-06-25 RX ADMIN — METOCLOPRAMIDE 5 MG: 5 INJECTION, SOLUTION INTRAMUSCULAR; INTRAVENOUS at 06:06

## 2017-06-25 RX ADMIN — POLYETHYLENE GLYCOL 3350 17 G: 17 POWDER, FOR SOLUTION ORAL at 03:06

## 2017-06-25 RX ADMIN — METRONIDAZOLE 500 MG: 500 INJECTION, SOLUTION INTRAVENOUS at 09:06

## 2017-06-25 RX ADMIN — MORPHINE SULFATE 4 MG: 10 INJECTION INTRAVENOUS at 12:06

## 2017-06-25 RX ADMIN — CIPROFLOXACIN 400 MG: 2 INJECTION, SOLUTION INTRAVENOUS at 01:06

## 2017-06-25 RX ADMIN — FAMOTIDINE 20 MG: 10 INJECTION, SOLUTION INTRAVENOUS at 09:06

## 2017-06-25 RX ADMIN — METRONIDAZOLE 500 MG: 500 INJECTION, SOLUTION INTRAVENOUS at 12:06

## 2017-06-25 RX ADMIN — FAMOTIDINE 20 MG: 10 INJECTION, SOLUTION INTRAVENOUS at 12:06

## 2017-06-25 RX ADMIN — DEXTROSE AND SODIUM CHLORIDE: 5; .9 INJECTION, SOLUTION INTRAVENOUS at 12:06

## 2017-06-25 RX ADMIN — SODIUM PHOSPHATE, DIBASIC AND SODIUM PHOSPHATE, MONOBASIC 1 ENEMA: 7; 19 ENEMA RECTAL at 01:06

## 2017-06-25 NOTE — ED NOTES
NG tube placed to R nare without complication, secured with silk tape and benzoin.  Gastric contents aspirated, Xray to be ordered for confirmation of placement.

## 2017-06-25 NOTE — ED TRIAGE NOTES
Pt reports upper abdominal pain, onset this morning with nausea.  Denies any vomiting, states last BM was 2 days ago.

## 2017-06-25 NOTE — PLAN OF CARE
06/25/17 1708   Discharge Assessment   Assessment Type Discharge Planning Assessment   Confirmed/corrected address and phone number on facesheet? Yes   Assessment information obtained from? Patient   Expected Length of Stay (days) 2   Communicated expected length of stay with patient/caregiver yes   Prior to hospitilization cognitive status: Alert/Oriented   Prior to hospitalization functional status: Assistive Equipment   Current cognitive status: Alert/Oriented   Current Functional Status: Assistive Equipment   Arrived From home or self-care   Lives With spouse   Able to Return to Prior Arrangements yes   Is patient able to care for self after discharge? Yes   Does the patient have family/friends to help with healtcare needs after discharge? yes   How many people do you have in your home that can help with your care after discharge? 1   Who are your caregiver(s) and their phone number(s)? :  Erich De Leon [phone]   Patient's perception of discharge disposition home or selfcare   Readmission Within The Last 30 Days no previous admission in last 30 days   Patient currently being followed by outpatient case management? No   Patient currently receives home health services? No   Does the patient currently use HME? Yes   Patient currently receives private duty nursing? No   Patient currently receives any other outside agency services? No   Equipment Currently Used at Home walker, standard   Do you have any problems affording any of your prescribed medications? No   Is the patient taking medications as prescribed? yes   Do you have any financial concerns preventing you from receiving the healthcare you need? No   Does the patient have transportation to healthcare appointments? Yes   Transportation Available family or friend will provide   On Dialysis? No   Does the patient receive services at the Coumadin Clinic? No   Are there any open cases? No   Discharge Plan A Home   Discharge Plan B Home with family    Patient/Family In Agreement With Plan yes     Danette Mares, RN Transitional Navigator  [phone]
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
## 2017-06-25 NOTE — NURSING
Called dr. conrad to inform him that patient states that she ate some soup and feels fine   She also states that's she had  2 bms   Doctor said she can go home

## 2017-06-25 NOTE — NURSING
Called dr. conrad to clarify orders for patient    Doctor ordered ng tube to be removed,  A regular diet , and a one time dose of mirilax     Also said that patient can be discharged when she has a bowel movement

## 2017-06-25 NOTE — PLAN OF CARE
Discharge orders noted, no HH or HME ordered.       06/25/17 1715   Final Note   Assessment Type Final Discharge Note   Discharge Disposition Home   What phone number can be called within the next 1-3 days to see how you are doing after discharge? [phone]   Hospital Follow Up  Appt(s) scheduled? (offices closed, [place] to follow up)   Offered [facility] -- Bedside Delivery? n/a   Discharge/Hospital Encounter Summary to (non-[facility]) PCP Yes   Referral to Outpatient Case Management complete? n/a   30 day supply of medicines given at discharge, if documented non-compliance / non-adherence? n/a   Any social issues identified prior to discharge? n/a   Did you assess the readiness or willingness of the family or caregiver to support self management of care? n/a   Right Care Referral Info   Post Acute Recommendation No Care     Danette Mares RN Transitional Navigator  [phone]
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
## 2017-06-25 NOTE — NURSING
Patient and family called to tell me that patient had a bowel movement    I asked if she used her bedside commode and she said no that she used the bathroom    They had already flushed it so I was unable to see the bm   Patient still waiting for family to bring some food for her to try and see if she doesn't have any nausea and therefore could be discharged

## 2017-06-26 ENCOUNTER — PATIENT OUTREACH (OUTPATIENT)
Dept: ADMINISTRATIVE | Facility: CLINIC | Age: 77
End: 2017-06-26

## 2017-06-26 NOTE — PROGRESS NOTES
C3 nurse attempted to contact patient. The following occurred:   C3 nurse attempted to contact Shira De Leon for a TCC post hospital discharge follow up call. The patient is unable to conduct the call @ this time. The patient requested a callback.    The patient does not have a scheduled HOSFU appointment within 7-14 days post hospital discharge date 6/25/17.

## 2017-06-27 NOTE — DISCHARGE SUMMARY
DATE OF ADMISSION:  06/24/2017    DATE OF DISCHARGE:  06/25/2017    HISTORY OF PRESENT ILLNESS:  This 77-year-old female was admitted to the   Emergency Room with history of abdominal pain associated with nausea and   vomiting.  The patient has not had a bowel movement for the past 3 days,   complained of diffuse pain with cramp, no gas, vomited once bilious material.    The patient was admitted about a month ago on the Surgical Service for possible   bowel obstruction.  The patient had previous several abdominal surgeries.  The   patient complained of nausea and vomiting.  The patient underwent workup in the   Emergency Room, which showed a possible small bowel obstruction with a CT scan.    The patient admitted, kept n.p.o., had an NG tube placed, given IV fluids.    Repeat x-ray following morning showed resolution of the small bowel obstruction,   showed lot of fecal material in the right side of the colon and transverse   colon.  The patient's NG tube was discontinued.  The patient was started on   liquid diet, given MiraLax and a Fleet Enema.  The patient had a bowel movement   and was tolerating diet.  The patient discharged home, advised to see me in the   office in a couple of weeks and to call if she had any problems, was given   regular diet, no heavy lifting and to call if she had any medical problems.      SUZIE  dd: 06/26/2017 09:20:50 (CDT)  td: 06/27/2017 01:21:02 (CDT)  Doc ID   #[id number]  Job ID #[id number]    CC:

## 2017-06-27 NOTE — PATIENT INSTRUCTIONS
Obstrucción del intestino maddox     La obstrucción del intestino maddox puede producir daños en el tejido e incluso muerte del tejido.   Barb obstrucción del intestino maddox consiste en un bloqueo total o parcial del intestino maddox, a consecuencia del cual el contenido del aparato digestivo no puede desplazarse adecuadamente por el intestino para salir del cuerpo. Es necesario tratamiento inmediato para eliminar el bloqueo. Ponce puede aliviar las molestias y también puede prevenir complicaciones graves, dayna muerte de tejido o ruptura del intestino maddox. La obstrucción del intestino maddox puede ser mortal si se mag sin tratamiento.  Causas de la obstrucción del intestino maddox  Las causas de la obstrucción del intestino maddox pueden ser:  · Tejido cicatricial (adherencias). Estas pueden formarse después de la cirugía abdominal o después de barb infección.  · Hernia (debilidad o desgarro en la pared del abdomen). Ponce puede hacer que barb porción del intestino maddox sobresalga en forma de bulto visible bajo la piel. Las hernias también pueden ocurrir internamente.  · Ciertos trastornos médicos. Entre estos, se encuentra la intususcepción (que ocurre cuando barb porción del intestino se desliza hacia dentro de otra) y el síndrome del intestino irritable, dayna la enfermedad de Crohn y la colitis ulcerosa (enfermedad que causa inflamación en el intestino).  · Crecimiento anormal de tejido (tumores). Los tumores pueden formarse en el interior o en el exterior del intestino maddox, y suelen ser consecuencia de cáncer.  Síntomas de la obstrucción del intestino maddox  Algunos de los síntomas frecuentes son:  · Cólicos y dolor en el abdomen  · Hinchazón y sensación de inflamiento o abotagamiento en el abdomen  · Náuseas y vómito  · Incapacidad de expulsar gas  · Incapacidad de defecar (estreñimiento)  · Diarrea  Diagnóstico de la obstrucción del intestino maddox  [place] médico le hará preguntas sobre billy  síntomas y billy antecedentes de jose l. También le harán un examen médico y posiblemente ciertas pruebas para confirmar el problema. Algunas de estas pruebas pueden consistir en:   · Pruebas con imágenes. Estas pruebas proporcionan imágenes del intestino maddox. Entre las más comunes se encuentran las radiografías y la tomografía computarizada (TC).  · Análisis de neyda. Para determinar si hay infección u otros problemas dayna deshidratación.  · Serie gastrointestinal superior y del intestino maddox. Se josiane radiografías del tracto digestivo superior, desde la boca hasta el intestino maddox inclusive. Se usa un líquido de contraste para revestir el interior del tracto digestivo superior de forma que aparezca claramente en las radiografías.  Tratamiento de la obstrucción del intestino maddox  El tratamiento se lleva a cabo en un hospital, y puede incluir lo siguiente:   · No se da comida ni bebida por la boca, para permitir que repose el intestino.  · Le colocarán barb sonda intravenosa (IV) en barb vena del brazo o de la mano. La sonda intravenosa se usa para administrar líquidos. También puede usarse para administrar medicamentos, los cuales pueden ser necesarios para aliviar el dolor, las náuseas y otros síntomas, así dayna para tratar o prevenir las infecciones.  · Se introduce un tubo blando, maddox y flexible (llamado sonda nasogástrica) a través de la nariz hasta el estómago. Ina tubo se usa para eliminar el exceso de gas y líquido del estómago y del intestino. [place] ayuda a aliviar los síntomas dayna el dolor y la hinchazón.  · En los casos más graves, dayna cuando el intestino maddox está obstruido en forma parcial o total, o si hay barb perforación del intestino, se realiza cirugía. Everardo la cirugía, se elimina el bloqueo. También es posible que eliminen ciertas partes del intestino si hay muerte de tejido. Pueden hacerse también otras reparaciones, según cuál sea la causa del bloqueo. El médico le dará  más información sobre la cirugía en alysha de que resulte necesaria.  · Usted permanecerá en observación en el hospital hasta que le mejoren los síntomas. Dalal médico le avisará cuándo puede regresar a casa.  Problemas de eliza plazo        Después del tratamiento, la mayoría de las personas se recupera sin efectos duraderos. Si se ha eliminado barb parte larga del intestino, existe barb mayor probabilidad de problemas digestivos de por ana, dayna evacuaciones irregulares. [place] con dalal médico para averiguar cuáles son las mejores maneras de controlar los síntomas que pueda tener a fin de proteger dalal jose l.     Cuándo debe llamar al médico  Llame a dalal médico de inmediato si tiene cualquiera de estos síntomas:  · Hinchazón o cólicos (retortijones) abdominales que no desaparecen  · Incapacidad de defecar o expulsar gas  · Náuseas o vómito (especialmente si el vómito tiene el aspecto o el olor de heces)  · Dolor muy marina. Llame al 911.   Date Last Reviewed: 3/31/2017  © 1923-5827 [employer]. [address] [place] [id number]. Todos los derechos reservados. Esta información no pretende sustituir la atención médica profesional. Sólo dalal médico puede diagnosticar y tratar un problema de jose l.

## 2017-08-21 ENCOUNTER — TELEPHONE (OUTPATIENT)
Dept: INTERNAL MEDICINE | Facility: CLINIC | Age: 77
End: 2017-08-21

## 2017-08-21 NOTE — TELEPHONE ENCOUNTER
----- Message from Arlyn Eugenia sent at 8/21/2017  9:44 AM CDT -----  Contact: self, [phone]  Patient called in requesting to speak with you regarding legs swelling and never getting a call for specialist appt.  Please advise.

## 2017-08-22 ENCOUNTER — OFFICE VISIT (OUTPATIENT)
Dept: FAMILY MEDICINE | Facility: CLINIC | Age: 77
End: 2017-08-22
Attending: FAMILY MEDICINE
Payer: MEDICARE

## 2017-08-22 VITALS
BODY MASS INDEX: 20.39 KG/M2 | DIASTOLIC BLOOD PRESSURE: 81 MMHG | OXYGEN SATURATION: 96 % | SYSTOLIC BLOOD PRESSURE: 142 MMHG | HEART RATE: 88 BPM | WEIGHT: 108 LBS | HEIGHT: 61 IN

## 2017-08-22 DIAGNOSIS — M25.471 ANKLE EDEMA, BILATERAL: ICD-10-CM

## 2017-08-22 DIAGNOSIS — I10 ESSENTIAL HYPERTENSION: Primary | Chronic | ICD-10-CM

## 2017-08-22 DIAGNOSIS — M25.472 ANKLE EDEMA, BILATERAL: ICD-10-CM

## 2017-08-22 PROCEDURE — 3079F DIAST BP 80-89 MM HG: CPT | Mod: S$GLB,,, | Performed by: FAMILY MEDICINE

## 2017-08-22 PROCEDURE — 99214 OFFICE O/P EST MOD 30 MIN: CPT | Mod: S$GLB,,, | Performed by: FAMILY MEDICINE

## 2017-08-22 PROCEDURE — 1157F ADVNC CARE PLAN IN RCRD: CPT | Mod: S$GLB,,, | Performed by: FAMILY MEDICINE

## 2017-08-22 PROCEDURE — 99999 PR PBB SHADOW E&M-EST. PATIENT-LVL III: CPT | Mod: PBBFAC,,, | Performed by: FAMILY MEDICINE

## 2017-08-22 PROCEDURE — 3077F SYST BP >= 140 MM HG: CPT | Mod: S$GLB,,, | Performed by: FAMILY MEDICINE

## 2017-08-22 PROCEDURE — 1159F MED LIST DOCD IN RCRD: CPT | Mod: S$GLB,,, | Performed by: FAMILY MEDICINE

## 2017-08-22 PROCEDURE — 1126F AMNT PAIN NOTED NONE PRSNT: CPT | Mod: S$GLB,,, | Performed by: FAMILY MEDICINE

## 2017-08-22 PROCEDURE — 3008F BODY MASS INDEX DOCD: CPT | Mod: S$GLB,,, | Performed by: FAMILY MEDICINE

## 2017-08-22 RX ORDER — HYDROCHLOROTHIAZIDE 12.5 MG/1
12.5 TABLET ORAL DAILY
Qty: 30 TABLET | Refills: 2 | Status: SHIPPED | OUTPATIENT
Start: 2017-08-22 | End: 2017-10-09 | Stop reason: SDUPTHER

## 2017-08-22 NOTE — PROGRESS NOTES
Subjective:       Patient ID: Shira De Leon is a 77 y.o. female.    Chief Complaint: Foot Swelling (Both feet)    77 yr old pleasant female with hypertension, GERD, pituitary tumor, OA knee, DJD L spine, presents today for an evaluation of HTN, ankle edema.      HTN - normal - she is on Norvasc 10 mg daily - has mild ankle edema intermittent     GERD - controlled - on PPI as needed    Pituitary tumor - stable - no symptoms    Osteoporosis - was on Reclast yearly - once - was following  and now ants to switch to [facility]      History as below - reviewed       Hypertension   This is a chronic problem. The current episode started more than 1 year ago. The problem has been gradually improving since onset. The problem is controlled. Pertinent negatives include no anxiety, chest pain, headaches, malaise/fatigue, neck pain, palpitations or shortness of breath. There are no associated agents to hypertension. Risk factors for coronary artery disease include post-menopausal state. Past treatments include calcium channel blockers. The current treatment provides moderate improvement. There are no compliance problems.  There is no history of angina, CAD/MI, CVA, left ventricular hypertrophy, PVD or renovascular disease. There is no history of chronic renal disease, hyperaldosteronism or a hypertension causing med.     Review of Systems   Constitutional: Negative.  Negative for activity change, diaphoresis, malaise/fatigue and unexpected weight change.   HENT: Negative.  Negative for congestion, ear discharge, hearing loss, rhinorrhea, sore throat and voice change.    Eyes: Negative.  Negative for pain, discharge and visual disturbance.   Respiratory: Negative.  Negative for chest tightness, shortness of breath and wheezing.    Cardiovascular: Negative.  Negative for chest pain and palpitations.   Gastrointestinal: Negative.  Negative for abdominal distention, anal bleeding, constipation and nausea.   Endocrine: Negative.  Negative  for cold intolerance, polydipsia and polyuria.   Genitourinary: Negative.  Negative for decreased urine volume, difficulty urinating, dysuria, frequency, menstrual problem and vaginal pain.   Musculoskeletal: Negative.  Negative for arthralgias, gait problem, myalgias and neck pain.   Skin: Negative.  Negative for color change, pallor and wound.   Allergic/Immunologic: Negative.  Negative for environmental allergies and immunocompromised state.   Neurological: Negative.  Negative for dizziness, tremors, seizures, speech difficulty and headaches.   Hematological: Negative.  Negative for adenopathy. Does not bruise/bleed easily.   Psychiatric/Behavioral: Negative.  Negative for agitation, confusion, decreased concentration, hallucinations, self-injury and suicidal ideas. The patient is not nervous/anxious.        PMH/PSH/FH/SH/MED/ALLERGY reviewed    Objective:       Vitals:    08/22/17 1319   BP: (!) 142/81   Pulse: 88       Physical Exam   Constitutional: She is oriented to person, place, and time. She appears well-developed and well-nourished. No distress.   HENT:   Head: Normocephalic and atraumatic.   Right Ear: External ear normal.   Left Ear: External ear normal.   Nose: Nose normal.   Mouth/Throat: Oropharynx is clear and moist. No oropharyngeal exudate.   Eyes: Conjunctivae and EOM are normal. Pupils are equal, round, and reactive to light. Right eye exhibits no discharge. Left eye exhibits no discharge. No scleral icterus.   Neck: Normal range of motion. Neck supple. No JVD present. No tracheal deviation present. No thyromegaly present.   Cardiovascular: Normal rate, regular rhythm, normal heart sounds and intact distal pulses.  Exam reveals no gallop and no friction rub.    No murmur heard.  Pulmonary/Chest: Effort normal and breath sounds normal. No stridor. She has no wheezes. She has no rales. She exhibits no tenderness.   Abdominal: Soft. Bowel sounds are normal. She exhibits no distension and no mass.  There is no tenderness. There is no rebound and no guarding. No hernia.   Musculoskeletal: Normal range of motion. She exhibits no edema or tenderness.   Lymphadenopathy:     She has no cervical adenopathy.   Neurological: She is alert and oriented to person, place, and time. She has normal reflexes. She displays normal reflexes. No cranial nerve deficit. She exhibits normal muscle tone. Coordination normal.   Skin: Skin is warm and dry. No rash noted. She is not diaphoretic. No erythema. No pallor.   Psychiatric: She has a normal mood and affect. Her behavior is normal. Judgment and thought content normal.       Assessment:       1. Essential hypertension    2. Ankle edema, bilateral        Plan:       Shira was seen today for foot swelling.    Diagnoses and all orders for this visit:    Essential hypertension  -     hydrochlorothiazide (HYDRODIURIL) 12.5 MG Tab; Take 1 tablet (12.5 mg total) by mouth once daily.    Ankle edema, bilateral  -     hydrochlorothiazide (HYDRODIURIL) 12.5 MG Tab; Take 1 tablet (12.5 mg total) by mouth once daily.      HTN/ankle edema  [facility]  -trial of HCTZ 2.5 mg daily. Side effects of medications have been discussed and patient agreed to proceed with treatment and understands the risks and benefits.  -drink fluids adequately    Spent adequate time in obtaining history and explaining differentials    40 minutes spent during this visit of which greater than 50% devoted to face-face counseling and coordination of care regarding diagnosis and management plan    Return if symptoms worsen or fail to improve.

## 2017-10-09 DIAGNOSIS — M25.472 ANKLE EDEMA, BILATERAL: ICD-10-CM

## 2017-10-09 DIAGNOSIS — M25.471 ANKLE EDEMA, BILATERAL: ICD-10-CM

## 2017-10-09 DIAGNOSIS — I10 ESSENTIAL HYPERTENSION: Chronic | ICD-10-CM

## 2017-10-09 RX ORDER — HYDROCHLOROTHIAZIDE 12.5 MG/1
12.5 TABLET ORAL DAILY
Qty: 90 TABLET | Refills: 1 | Status: SHIPPED | OUTPATIENT
Start: 2017-10-09 | End: 2019-05-30

## 2017-10-16 ENCOUNTER — OFFICE VISIT (OUTPATIENT)
Dept: PAIN MEDICINE | Facility: CLINIC | Age: 77
End: 2017-10-16
Payer: MEDICARE

## 2017-10-16 VITALS
BODY MASS INDEX: 20.41 KG/M2 | DIASTOLIC BLOOD PRESSURE: 74 MMHG | WEIGHT: 108 LBS | SYSTOLIC BLOOD PRESSURE: 116 MMHG | HEART RATE: 62 BPM

## 2017-10-16 DIAGNOSIS — M46.1 SACROILIITIS: Primary | ICD-10-CM

## 2017-10-16 DIAGNOSIS — M70.72 ISCHIAL BURSITIS OF LEFT SIDE: ICD-10-CM

## 2017-10-16 PROCEDURE — 99213 OFFICE O/P EST LOW 20 MIN: CPT | Mod: S$GLB,,, | Performed by: ANESTHESIOLOGY

## 2017-10-16 PROCEDURE — 99999 PR PBB SHADOW E&M-EST. PATIENT-LVL II: CPT | Mod: PBBFAC,,, | Performed by: ANESTHESIOLOGY
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
## 2017-10-27 NOTE — DISCHARGE INSTRUCTIONS
Home Care Instructions Pain Management:    1.  DIET:    You may resume your normal diet today.    2.  BATHING:    You may shower with luke warm water.    3.  DRESSING:    You may remove your bandage today.    4.  ACTIVITY LEVEL:      You may resume your normal activities 24 hours after your procedure.    5.  MEDICATIONS:    You may resume your normal medications today.    6.  SPECIAL INSTRUCTIONS:    No heat to the injection site for 24 hours including bath or shower, heating pad, moist heat or hot tubs.    Use an ice pack to the injection site for any pain or discomfort.  Apply ice packs for 20 minute intervals as needed.    If you have received any sedatives by mouth today, you can not drive for 12 hours.    If you have received sedation through an IV, you can not drive for 24 hours.    PLEASE CALL YOUR DOCTOR FOR THE FOLLOWIN.  Redness or swelling around the injection site.  2.  Fever of 101 degrees.  3.  Drainage (pus) from the injection site.  4.  For any continuous bleeding (some dried blood over the incision is normal.)    FOR EMERGENCIES:    If any unusual problems or difficulties occur during clinic hours, call [phone] or dial [phone].    Follow up with with your physician in 2-3 weeks.         Sedación de procedimiento  Barb sedación de procedimiento es la administración de medicamentos para aliviar las molestias, el dolor y la ansiedad aneta un procedimiento. Estos medicamentos suelen administrarse mediante barb vía intravenosa (IV) en luis a de billy brazos o en barb de billy fadia. En algunos casos, puede que se los administren por la boca o por inhalación. Mientras esté bajo los efectos de la sedación, probablemente estará despierto. Bell puede que no recuerde nada después.  ¿Por qué se usa la sedación de procedimiento?  La sedación se usa para muchos tipos de procedimientos. El objetivo es reducir el dolor, la ansiedad y los recuerdos estresantes de un procedimiento. También puede ayudar al  proveedor de atención médica que lo está tratando. Por ejemplo, puede ser más fácil repararle un hueso roto si usted se siente relajado.  La sedación de procedimiento se usa solo para procedimientos breves y básicos. No se usa para cirugías complejas. Algunos procedimientos que usan carlton tipo de sedación son, por ejemplo:  · Cirugía dental  · Biopsia de seno, para karen barb muestra del tejido del seno  · Endoscopia, para estudiar problemas gastrointestinales  · Broncoscopia, para comprobar si hay problemas en los pulmones  · Realineación de huesos o articulaciones, para corregir un hueso roto o barb articulación dislocada  · Cirugía coleen de un pie o de la piel  · Cardioversión eléctrica, para restablecer el ritmo cardíaco normal  · Punción lumbar, para evaluar barb enfermedad neurológica  Riesgos de barb sedación de procedimiento  La sedación de procedimiento tiene algunos riesgos y efectos secundarios posibles, tales dayna los siguientes:  · Dolor de ellen  · Náuseas y vómito  · Recuerdos desagradables del procedimiento  · Frecuencia respiratoria más baja  · Cambios en la frecuencia cardíaca y la presión arterial (en raros casos)  · Inhalación del contenido del estómago en chiqui pulmones (en raros casos)  Los efectos secundarios probablemente desaparecerán poco después del procedimiento. Dalal equipo de atención médica observará dalal frecuencia cardíaca y dalal respiración aneta dalal sedación. Eso es para ayudar a prevenir problemas.  Chiqui propios riesgos dependerán de dalal edad y dalal estado general de jose l. También dependerán del tipo de sedación que le administren. Consulte a dalal proveedor de atención médica sobre cuáles son los riesgos que más corresponden a dalal alysha.  Cómo prepararse para barb sedación de procedimiento  Hable con dalal proveedor de atención médica sobre cómo debe prepararse para dalal procedimiento. Dígale todos los medicamentos que miguel a. [place] incluye los medicamentos de venta david, dayna el ibuprofeno. También  incluye las vitaminas, hierbas y otros suplementos. Puede que deba dejar de karen algunos medicamentos antes del procedimiento, dayna los anticoagulantes y la aspirina. Si fuma, puede que deba dejar de hacerlo. Hable con dalal proveedor de atención médica si necesita ayuda para dejar de fumar.  Avise a dalal proveedor de atención médica si:  · Ha tenido algún problema en el pasado con la sedación o la anestesia  · Ha tenido cambios recientes en dalal jose l, dayna barb infección o fiebre  · Está embarazada o laura que puede estarlo  Y también jennifer lo siguiente:  · Coordine con algún amigo o miembro adulto de la amber para que pueda llevarle a casa después del procedimiento. No podrá conducir el mismo día en que estuvo bajo sedación.  · No coma ni minal nada a partir de la medianoche anterior a dalal procedimiento si así le indicaron.  · Siga todas las demás instrucciones que le dé dalal proveedor de atención médica.  Everardo dalal sedación de procedimiento  Puede que le kellie el procedimiento en un hospital o barb clínica médica. Le administrará la sedación un proveedor de atención médica capacitado especialmente. En líneas generales, sucederá lo siguiente:  · Le administrarán estos medicamentos mediante barb vía intravenosa (IV) en luis a de billy brazos o en barb de billy fadia. O puede que le apliquen barb inyección. Estos medicamentos también podrían administrarse por boca. O inhalarse a través de barb mascarilla.  · Si le administran los medicamentos a través de barb línea IV, podrá sentir los efectos muy rápidamente. Comenzará a sentirse relajado y somnoliento.  · Everardo el procedimiento, vigilarán atentamente dalal frecuencia cardíaca, dalal respiración y dalal presión arterial. Dalal respiración y dalal presión arterial pueden bajar un poco. Bell probablemente no necesitará ayuda para respirar. Es posible que le den algo de oxígeno adicional con barb mascarilla.  · Es probable que esté despierto todo el tiempo. Si se duerme, podrán despertarlo fácilmente de  ser necesario. Debería sentir poco o nada de dolor.  · Cuando termine dalal procedimiento, se detendrán los efectos de la sedación.  Después de dalal sedación de procedimiento  Comenzará a sentirse más despierto y alerta. Bell, probablemente seguirá estando algo somnoliento por un tiempo después del procedimiento. Lo seguirán vigilando atentamente hasta que esté más alerta. Puede que tenga un vago recuerdo del procedimiento. O que no lo recuerde en absoluto.  Debería poder irse a casa barb o dos horas después del procedimiento. Planifique que alguien se quede con usted por algunas horas. Los efectos secundarios, dayna dolor de ellen y náuseas, pueden irse rápidamente. Informe a dalal proveedor de atención médica si estos efectos continúan.  No conduzca ni tome ninguna decisión importante por unas 24 horas dayna mínimo. Asegúrese de seguir todas las instrucciones para billy cuidados posteriores al procedimiento.     Cuándo llamar a dalal proveedor de atención médica  Chantal que alguien llame a dalal proveedor de atención médica de inmediato si nota alguno de los siguientes síntomas:  · Somnolencia que empeora  · Debilidad o mareos que van en aumento  · Vómito persistente  · No se lo puede despertar   Date Last Reviewed: 2/6/2015  © 4905-7317 [employer] [facility], [employer]. [address] [place] [address], [place] [address]. All rights reserved. This information is not intended as a substitute for professional medical care. Always follow your healthcare professional's instructions.

## 2017-10-31 ENCOUNTER — TELEPHONE (OUTPATIENT)
Dept: PAIN MEDICINE | Facility: CLINIC | Age: 77
End: 2017-10-31

## 2017-10-31 ENCOUNTER — TELEPHONE (OUTPATIENT)
Dept: PAIN MEDICINE | Facility: HOSPITAL | Age: 77
End: 2017-10-31

## 2017-10-31 NOTE — TELEPHONE ENCOUNTER
----- Message from Basia Terry sent at 10/31/2017  9:46 AM CDT -----  Contact: Self/ [phone]  Patient would like to speak with you about getting instructions for her procedure tomorrow. Please advise.

## 2017-10-31 NOTE — TELEPHONE ENCOUNTER
Spoke with patient's  (Erich) regarding time of arrival for procedure that is scheduled on 11/1/17. Patient was informed that the patient time of arrival is at 1245 PM. Patient's  verbalized understanding.

## 2017-11-01 ENCOUNTER — HOSPITAL ENCOUNTER (OUTPATIENT)
Facility: HOSPITAL | Age: 77
Discharge: HOME | End: 2017-11-01
Attending: ANESTHESIOLOGY | Admitting: ANESTHESIOLOGY
Payer: MEDICARE

## 2017-11-01 ENCOUNTER — SURGERY (OUTPATIENT)
Age: 77
End: 2017-11-01

## 2017-11-01 VITALS
HEIGHT: 61 IN | BODY MASS INDEX: 20.58 KG/M2 | RESPIRATION RATE: 16 BRPM | WEIGHT: 109 LBS | HEART RATE: 64 BPM | SYSTOLIC BLOOD PRESSURE: 152 MMHG | TEMPERATURE: 98 F | OXYGEN SATURATION: 98 % | DIASTOLIC BLOOD PRESSURE: 72 MMHG

## 2017-11-01 PROCEDURE — 20610 DRAIN/INJ JOINT/BURSA W/O US: CPT | Performed by: ANESTHESIOLOGY

## 2017-11-01 PROCEDURE — 27096 INJECT SACROILIAC JOINT: CPT | Performed by: ANESTHESIOLOGY

## 2017-11-01 PROCEDURE — 27096 INJECT SACROILIAC JOINT: CPT | Mod: LT,,, | Performed by: ANESTHESIOLOGY

## 2017-11-01 PROCEDURE — 20605 DRAIN/INJ JOINT/BURSA W/O US: CPT | Performed by: ANESTHESIOLOGY

## 2017-11-01 PROCEDURE — 25000003 PHARM REV CODE 250: Performed by: ANESTHESIOLOGY

## 2017-11-01 PROCEDURE — 20610 DRAIN/INJ JOINT/BURSA W/O US: CPT | Mod: 59,LT,, | Performed by: ANESTHESIOLOGY

## 2017-11-01 PROCEDURE — 25500020 PHARM REV CODE 255: Performed by: ANESTHESIOLOGY

## 2017-11-01 PROCEDURE — 63600175 PHARM REV CODE 636 W HCPCS: Performed by: ANESTHESIOLOGY

## 2017-11-01 RX ORDER — MIDAZOLAM HYDROCHLORIDE 1 MG/ML
INJECTION, SOLUTION INTRAMUSCULAR; INTRAVENOUS
Status: DISCONTINUED | OUTPATIENT
Start: 2017-11-01 | End: 2017-11-01 | Stop reason: HOSPADM

## 2017-11-01 RX ORDER — SODIUM CHLORIDE, SODIUM LACTATE, POTASSIUM CHLORIDE, CALCIUM CHLORIDE 600; 310; 30; 20 MG/100ML; MG/100ML; MG/100ML; MG/100ML
INJECTION, SOLUTION INTRAVENOUS CONTINUOUS
Status: DISCONTINUED | OUTPATIENT
Start: 2017-11-01 | End: 2017-11-01 | Stop reason: HOSPADM

## 2017-11-01 RX ORDER — FENTANYL CITRATE 50 UG/ML
INJECTION, SOLUTION INTRAMUSCULAR; INTRAVENOUS
Status: DISCONTINUED | OUTPATIENT
Start: 2017-11-01 | End: 2017-11-01 | Stop reason: HOSPADM

## 2017-11-01 RX ORDER — LIDOCAINE HYDROCHLORIDE 10 MG/ML
INJECTION, SOLUTION EPIDURAL; INFILTRATION; INTRACAUDAL; PERINEURAL
Status: DISCONTINUED | OUTPATIENT
Start: 2017-11-01 | End: 2017-11-01 | Stop reason: HOSPADM

## 2017-11-01 RX ORDER — BUPIVACAINE HYDROCHLORIDE 2.5 MG/ML
INJECTION, SOLUTION EPIDURAL; INFILTRATION; INTRACAUDAL
Status: DISCONTINUED | OUTPATIENT
Start: 2017-11-01 | End: 2017-11-01 | Stop reason: HOSPADM

## 2017-11-01 RX ORDER — TRIAMCINOLONE ACETONIDE 40 MG/ML
INJECTION, SUSPENSION INTRA-ARTICULAR; INTRAMUSCULAR
Status: DISCONTINUED | OUTPATIENT
Start: 2017-11-01 | End: 2017-11-01 | Stop reason: HOSPADM

## 2017-11-01 RX ADMIN — TRIAMCINOLONE ACETONIDE 40 MG: 40 INJECTION, SUSPENSION INTRA-ARTICULAR; INTRAMUSCULAR at 02:11

## 2017-11-01 RX ADMIN — SODIUM CHLORIDE, SODIUM LACTATE, POTASSIUM CHLORIDE, AND CALCIUM CHLORIDE 75 ML/HR: .6; .31; .03; .02 INJECTION, SOLUTION INTRAVENOUS at 01:11

## 2017-11-01 RX ADMIN — MIDAZOLAM 2 MG: 1 INJECTION INTRAMUSCULAR; INTRAVENOUS at 02:11

## 2017-11-01 RX ADMIN — FENTANYL CITRATE 50 MCG: 50 INJECTION, SOLUTION INTRAMUSCULAR; INTRAVENOUS at 02:11

## 2017-11-01 RX ADMIN — LIDOCAINE HYDROCHLORIDE 5 ML: 10 INJECTION, SOLUTION EPIDURAL; INFILTRATION; INTRACAUDAL; PERINEURAL at 02:11

## 2017-11-01 RX ADMIN — IOHEXOL 5 ML: 300 INJECTION, SOLUTION INTRAVENOUS at 02:11

## 2017-11-01 RX ADMIN — BUPIVACAINE HYDROCHLORIDE 5 ML: 2.5 INJECTION, SOLUTION EPIDURAL; INFILTRATION; INTRACAUDAL; PERINEURAL at 02:11

## 2017-11-01 NOTE — OP NOTE
Patient Name: Shira De Leon  MRN: [id number]    INFORMED CONSENT: The procedure, risks, benefits and options were discussed with patient. There are no contraindications to the procedure. The patient expressed understanding and agreed to proceed. The personnel performing the procedure was discussed. I verify that I personally obtained Shira's consent prior to the start of the procedure and the signed consent can be found on the patient's chart.      Procedure Date: 11/1/2017  Anesthesia:   systemic  Pre Procedure diagnosis:   Sacroiliitis, ischial bursitis   Post-Procedure diagnosis:   Same    Sedation: Yes - Fentanyl 50 mcg and Midazolam 2 mg    PROCEDURE: LEFT  SACROILIAC JOINT INJECTION  DESCRIPTION OF PROCEDURE: The patient was brought to the fluoroscopy suite.  IV access was obtained prior to the procedure. The patient was positioned prone on the fluoroscopy table. Continuous hemodynamic monitoring was initiated including blood pressure, EKG, and pulse oximetry.  The lumbosacral area was prepped with chlorhexidine three times and draped into a sterile field. The skin overlying the  left sacroiliac joint was anesthetized using 5 cc of lidocaine 1%. The inferior pole of the sacroiliac joint was identified by cephalo-oblique fluoroscopy. A 22 gauge, 3 1/2 inch spinal needle was slowly advanced through the sacroiliac joint capsule under fluoroscopic guidance. The needle position was confirmed using oblique, AP and lateral fluoroscopic imaging.  Negative aspiration was confirmed. 5 cc of Omnipaque 300 was injected confirming intra-articular contrast spread. A combination of 5 cc of Bupivacaine 0.25% and 20 mg kenalog was easily injected . The needle was removed and bleeding was nil.  A sterile dressing was applied. PATIENT was taken to the Post-block Recovery Area for further observation.      PROCEDURE:left ischial BURSA INJECTION      DESCRIPTION OF PROCEDURE: The patient was brought to the procedure room.  IV access was  "obtained prior to the procedure. The patient was positioned prone  on the fluoroscopy table. Continuous hemodynamic monitoring was initiated including blood pressure, EKG, and pulse oximetry. The skin overlying the greater trochanter was prepped with chlrhexidine three times and draped in a sterile fashion. The skin and subcutaneous tissue was anesthetized using 5 cc of lidocaine 1%. A  22 gauge 3.5" spinal needle was slowly advanced through under fluoroscopic guidance into the ischial bhura The needle position was confirmed using  AP imaging. Negative aspiration was confirmed. 5 cc of Omnipaque 300 was injected confirming appropriate contrast spread along the hamstrings insertion . A combination of 5 cc of Bupivacaine 0.25% and 20 mg kenalog was easily injected. The needle was removed and bleeding was nil. A sterile dressing was applied. Shira was taken back to the recovery room for further observation.    Blood Loss: Nill  Specimen: None    Pre Procedure Pain Level: 6/10  Post-Procedure Pain Level: 0/10        Discharge Diagnosis: Same as Pre and Post Procedure  Condition on Discharge: Stable.  Diet on Discharge: Same as before.  Activity: as per instruction sheet.  Discharge to: Home with a responsible adult.  Follow up: as per Discharge instructions      .                     "
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
## 2017-11-06 ENCOUNTER — TELEPHONE (OUTPATIENT)
Dept: FAMILY MEDICINE | Facility: CLINIC | Age: 77
End: 2017-11-06

## 2017-11-06 NOTE — TELEPHONE ENCOUNTER
----- Message from Roseanna Person sent at 11/6/2017 11:47 AM CST -----  Contact: [phone]/sellf   Pt requesting to speak with you in regarding rx hydrochlorothiazide (HYDRODIURIL) 12.5 MG  . States her blood pressure goes up and down . Please advise

## 2017-11-06 NOTE — TELEPHONE ENCOUNTER
Offered appointment today with Dr Curran due to Dr Bravo being booked out.  Patient declined.  Will go to ER if blood pressure increases.  Patient states her b/p is in the 140 systolic currently.
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
## 2017-11-29 ENCOUNTER — OFFICE VISIT (OUTPATIENT)
Dept: PAIN MEDICINE | Facility: CLINIC | Age: 77
End: 2017-11-29
Payer: MEDICARE

## 2017-11-29 VITALS
DIASTOLIC BLOOD PRESSURE: 90 MMHG | WEIGHT: 107.13 LBS | HEART RATE: 69 BPM | BODY MASS INDEX: 20.24 KG/M2 | SYSTOLIC BLOOD PRESSURE: 170 MMHG

## 2017-11-29 DIAGNOSIS — M46.1 SACROILIITIS: Primary | ICD-10-CM

## 2017-11-29 PROCEDURE — 99213 OFFICE O/P EST LOW 20 MIN: CPT | Mod: S$GLB,,, | Performed by: NURSE PRACTITIONER

## 2017-11-29 PROCEDURE — 99999 PR PBB SHADOW E&M-EST. PATIENT-LVL III: CPT | Mod: PBBFAC,,, | Performed by: NURSE PRACTITIONER

## 2019-01-12 ENCOUNTER — OFFICE VISIT (OUTPATIENT)
Dept: URGENT CARE | Facility: CLINIC | Age: 79
End: 2019-01-12
Payer: MEDICARE

## 2019-01-12 VITALS
OXYGEN SATURATION: 97 % | WEIGHT: 107 LBS | SYSTOLIC BLOOD PRESSURE: 175 MMHG | BODY MASS INDEX: 20.2 KG/M2 | DIASTOLIC BLOOD PRESSURE: 94 MMHG | TEMPERATURE: 99 F | HEIGHT: 61 IN | RESPIRATION RATE: 18 BRPM | HEART RATE: 95 BPM

## 2019-01-12 DIAGNOSIS — R05.9 COUGH: ICD-10-CM

## 2019-01-12 DIAGNOSIS — J32.9 SINUSITIS, UNSPECIFIED CHRONICITY, UNSPECIFIED LOCATION: Primary | ICD-10-CM

## 2019-01-12 DIAGNOSIS — H65.193 ACUTE EFFUSION OF BOTH MIDDLE EARS: ICD-10-CM

## 2019-01-12 PROCEDURE — 71046 X-RAY EXAM CHEST 2 VIEWS: CPT | Mod: FY,S$GLB,, | Performed by: RADIOLOGY

## 2019-01-12 PROCEDURE — 71046 XR CHEST PA AND LATERAL: ICD-10-PCS | Mod: FY,S$GLB,, | Performed by: RADIOLOGY

## 2019-01-12 PROCEDURE — 99214 PR OFFICE/OUTPT VISIT, EST, LEVL IV, 30-39 MIN: ICD-10-PCS | Mod: 25,S$GLB,, | Performed by: PHYSICIAN ASSISTANT

## 2019-01-12 PROCEDURE — 3077F PR MOST RECENT SYSTOLIC BLOOD PRESSURE >= 140 MM HG: ICD-10-PCS | Mod: CPTII,S$GLB,, | Performed by: PHYSICIAN ASSISTANT

## 2019-01-12 PROCEDURE — 99214 OFFICE O/P EST MOD 30 MIN: CPT | Mod: 25,S$GLB,, | Performed by: PHYSICIAN ASSISTANT

## 2019-01-12 PROCEDURE — 96372 PR INJECTION,THERAP/PROPH/DIAG2ST, IM OR SUBCUT: ICD-10-PCS | Mod: S$GLB,,, | Performed by: PHYSICIAN ASSISTANT

## 2019-01-12 PROCEDURE — 96372 THER/PROPH/DIAG INJ SC/IM: CPT | Mod: S$GLB,,, | Performed by: PHYSICIAN ASSISTANT

## 2019-01-12 PROCEDURE — 3077F SYST BP >= 140 MM HG: CPT | Mod: CPTII,S$GLB,, | Performed by: PHYSICIAN ASSISTANT

## 2019-01-12 PROCEDURE — 3080F PR MOST RECENT DIASTOLIC BLOOD PRESSURE >= 90 MM HG: ICD-10-PCS | Mod: CPTII,S$GLB,, | Performed by: PHYSICIAN ASSISTANT

## 2019-01-12 PROCEDURE — 3080F DIAST BP >= 90 MM HG: CPT | Mod: CPTII,S$GLB,, | Performed by: PHYSICIAN ASSISTANT

## 2019-01-12 RX ORDER — DEXAMETHASONE SODIUM PHOSPHATE 100 MG/10ML
6 INJECTION INTRAMUSCULAR; INTRAVENOUS ONCE
Status: COMPLETED | OUTPATIENT
Start: 2019-01-12 | End: 2019-01-12

## 2019-01-12 RX ADMIN — DEXAMETHASONE SODIUM PHOSPHATE 6 MG: 100 INJECTION INTRAMUSCULAR; INTRAVENOUS at 11:01

## 2019-01-12 NOTE — PATIENT INSTRUCTIONS
Sinusitis (No Antibiotics)    The sinuses are air-filled spaces within the bones of the face. They connect to the inside of the nose. Sinusitis is an inflammation of the tissue lining the sinus cavity. Sinus inflammation can occur during a cold. It can also be due to allergies to pollens and other particles in the air. It can cause symptoms such as sinus congestion, headache, sore throat, facial swelling and fullness. It may also cause a low-grade fever. No infection is present, and no antibiotic treatment is needed.  Home care  · Drink plenty of water, hot tea, and other liquids. This may help thin mucus. It also may promote sinus drainage.  · Heat may help soothe painful areas of the face. Use a towel soaked in hot water. Or,  the shower and direct the hot spray onto your face. Using a vaporizer along with a menthol rub at night may also help.   · An expectorant containing guaifenesin may help thin the mucus and promote drainage from the sinuses.  · Over-the-counter decongestants may be used unless a similar medicine was prescribed. Nasal sprays work the fastest. Use one that contains phenylephrine or oxymetazoline. First blow the nose gently. Then use the spray. Do not use these medicines more often than directed on the label or symptoms may get worse. You may also use tablets containing pseudoephedrine. Avoid products that combine ingredients, because side effects may be increased. Read labels. You can also ask the pharmacist for help. (NOTE: Persons with high blood pressure should not use decongestants. They can raise blood pressure.)  · Over-the-counter antihistamines may help if allergies contributed to your sinusitis.    · Use acetaminophen or ibuprofen to control pain, unless another pain medicine was prescribed. (If you have chronic liver or kidney disease or ever had a stomach ulcer, talk with your doctor before using these medicines. Aspirin should never be used in anyone under 18 years of age  who is ill with a fever. It may cause severe liver damage.)  · Use nasal rinses or irrigation as instructed by your health care provider.  · Don't smoke. This can worsen symptoms.  Follow-up care  Follow up with your healthcare provider or our staff if you are not improving within the next week.  When to seek medical advice  Call your healthcare provider if any of these occur:  · Green or yellow discharge from the nose or into the throat  · Facial pain or headache becoming more severe  · Stiff neck  · Unusual drowsiness or confusion  · Swelling of the forehead or eyelids  · Vision problems, including blurred or double vision  · Fever of 100.4ºF (38ºC) or higher, or as directed by your healthcare provider  · Seizure  · Breathing problems  · Symptoms not resolving within 10 days  Date Last Reviewed: 4/13/2015  © 5645-3470 [email or web address]. [address] [place] [address]. All rights reserved. This information is not intended as a substitute for professional medical care. Always follow your healthcare professional's instructions.      Please follow up with your Primary care provider within 2-5 days if your signs and symptoms have not resolved or worsen.     If your condition worsens or fails to improve we recommend that you receive another evaluation at the emergency room immediately or contact your primary medical clinic to discuss your concerns.   You must understand that you have received an Urgent Care treatment only and that you may be released before all of your medical problems are known or treated. You, the patient, will arrange for follow up care as instructed.     RED FLAGS/WARNING SYMPTOMS DISCUSSED WITH PATIENT THAT WOULD WARRANT EMERGENT MEDICAL ATTENTION. PATIENT VERBALIZED UNDERSTANDING.     Elevated Blood Pressure  Your blood pressure was elevated during your visit to the urgent care.  It was not so high that immediate care was needed but it is recommended that you monitor your blood  pressure over the next week or two to make sure that it is not staying elevated.  Please have your blood pressure taken 2-3 times daily at different times of the day.  Write all of those blood pressures down and record the time that they were taken.  Keep all that information and take it with you to see your Primary Care Physician.  If your blood pressure is consistently above 140/90 you will need to follow up with your PCP more quickly    You were given a steroid injection today. Risks and benefits were discussed with you.   If you have diabetes this injection will raise your blood sugar. This will normalize over the next 2-3 days. Please make sure you monitor you blood sugar accordingly.   This medication can also increase you blood pressure. Please monitor your blood pressure as discussed.   Please keep in mind that you should not take long term steroids unless prescribed by your physician. You should not exceed 4 steroid injections in a year and if you have multiple injections they should be spaced out adequately. Long term side effects and risks can occur as mentioned at todays visit.

## 2019-01-12 NOTE — PROGRESS NOTES
"Subjective:       Patient ID: Shira De Leon is a 78 y.o. female.    Vitals:  height is 5' 1" (1.549 m) and weight is 48.5 kg (107 lb). Her temperature is 98.7 °F (37.1 °C). Her blood pressure is 175/94 (abnormal) and her pulse is 95. Her respiration is 18 and oxygen saturation is 97%.     Chief Complaint: Cough    Cough   This is a new problem. Episode onset: 2 days. The problem has been unchanged. The problem occurs constantly. Associated symptoms include ear pain and headaches. Pertinent negatives include no chills, eye redness, fever, hemoptysis, myalgias, rash, shortness of breath or wheezing. Nothing aggravates the symptoms. She has tried nothing for the symptoms.       Constitution: Negative for chills, sweating, fatigue and fever.   HENT: Positive for ear pain, congestion, sinus pain and sinus pressure. Negative for voice change.    Neck: Negative for painful lymph nodes.   Eyes: Negative for eye redness.   Respiratory: Positive for chest tightness and cough. Negative for sputum production, bloody sputum, COPD, shortness of breath, stridor, wheezing and asthma.    Gastrointestinal: Negative for nausea and vomiting.   Musculoskeletal: Negative for muscle ache.   Skin: Negative for rash.   Allergic/Immunologic: Negative for seasonal allergies and asthma.   Neurological: Positive for headaches.   Hematologic/Lymphatic: Negative for swollen lymph nodes.       Objective:      Physical Exam   Constitutional: She is oriented to person, place, and time. She appears well-developed and well-nourished. She is cooperative.  Non-toxic appearance. She does not appear ill. No distress.   HENT:   Head: Normocephalic and atraumatic.   Right Ear: Hearing, external ear and ear canal normal. A middle ear effusion is present.   Left Ear: Hearing, external ear and ear canal normal. A middle ear effusion is present.   Nose: Rhinorrhea present. No mucosal edema or nasal deformity. No epistaxis. Right sinus exhibits no maxillary sinus " tenderness and no frontal sinus tenderness. Left sinus exhibits no maxillary sinus tenderness and no frontal sinus tenderness.   Mouth/Throat: Uvula is midline, oropharynx is clear and moist and mucous membranes are normal. No trismus in the jaw. Normal dentition. No uvula swelling. No posterior oropharyngeal erythema.   Eyes: Conjunctivae and lids are normal. No scleral icterus.   Sclera clear bilat   Neck: Trachea normal, full passive range of motion without pain and phonation normal. Neck supple.   Cardiovascular: Normal rate, regular rhythm, normal heart sounds, intact distal pulses and normal pulses.   Pulmonary/Chest: Effort normal. No accessory muscle usage or stridor. No respiratory distress. She has no decreased breath sounds. She has no wheezes. She has no rhonchi. She has no rales.   Intermittent dry cough   Abdominal: Soft. Normal appearance and bowel sounds are normal. She exhibits no distension. There is no tenderness.   Musculoskeletal: Normal range of motion. She exhibits no edema or deformity.   Neurological: She is alert and oriented to person, place, and time. She exhibits normal muscle tone. Coordination normal.   Skin: Skin is warm, dry and intact. She is not diaphoretic. No pallor.   Psychiatric: She has a normal mood and affect. Her speech is normal and behavior is normal. Judgment and thought content normal. Cognition and memory are normal.   Nursing note and vitals reviewed.      XRAY: no acute cardiopulmonary processes noted    Assessment:       1. Sinusitis, unspecified chronicity, unspecified location    2. Cough    3. Acute effusion of both middle ears        Plan:         Sinusitis, unspecified chronicity, unspecified location  -     dexamethasone injection 6 mg    Cough  -     X-Ray Chest PA And Lateral; Future; Expected date: 01/12/2019    Acute effusion of both middle ears          Sinusitis (No Antibiotics)    The sinuses are air-filled spaces within the bones of the face. They  connect to the inside of the nose. Sinusitis is an inflammation of the tissue lining the sinus cavity. Sinus inflammation can occur during a cold. It can also be due to allergies to pollens and other particles in the air. It can cause symptoms such as sinus congestion, headache, sore throat, facial swelling and fullness. It may also cause a low-grade fever. No infection is present, and no antibiotic treatment is needed.  Home care  · Drink plenty of water, hot tea, and other liquids. This may help thin mucus. It also may promote sinus drainage.  · Heat may help soothe painful areas of the face. Use a towel soaked in hot water. Or,  the shower and direct the hot spray onto your face. Using a vaporizer along with a menthol rub at night may also help.   · An expectorant containing guaifenesin may help thin the mucus and promote drainage from the sinuses.  · Over-the-counter decongestants may be used unless a similar medicine was prescribed. Nasal sprays work the fastest. Use one that contains phenylephrine or oxymetazoline. First blow the nose gently. Then use the spray. Do not use these medicines more often than directed on the label or symptoms may get worse. You may also use tablets containing pseudoephedrine. Avoid products that combine ingredients, because side effects may be increased. Read labels. You can also ask the pharmacist for help. (NOTE: Persons with high blood pressure should not use decongestants. They can raise blood pressure.)  · Over-the-counter antihistamines may help if allergies contributed to your sinusitis.    · Use acetaminophen or ibuprofen to control pain, unless another pain medicine was prescribed. (If you have chronic liver or kidney disease or ever had a stomach ulcer, talk with your doctor before using these medicines. Aspirin should never be used in anyone under 18 years of age who is ill with a fever. It may cause severe liver damage.)  · Use nasal rinses or irrigation as  instructed by your health care provider.  · Don't smoke. This can worsen symptoms.  Follow-up care  Follow up with your healthcare provider or our staff if you are not improving within the next week.  When to seek medical advice  Call your healthcare provider if any of these occur:  · Green or yellow discharge from the nose or into the throat  · Facial pain or headache becoming more severe  · Stiff neck  · Unusual drowsiness or confusion  · Swelling of the forehead or eyelids  · Vision problems, including blurred or double vision  · Fever of 100.4ºF (38ºC) or higher, or as directed by your healthcare provider  · Seizure  · Breathing problems  · Symptoms not resolving within 10 days  Date Last Reviewed: 4/13/2015  © 6034-3901 [employer]. [address] [place] [address], [place] [address]. All rights reserved. This information is not intended as a substitute for professional medical care. Always follow your healthcare professional's instructions.      Please follow up with your Primary care provider within 2-5 days if your signs and symptoms have not resolved or worsen.     If your condition worsens or fails to improve we recommend that you receive another evaluation at the emergency room immediately or contact your primary medical clinic to discuss your concerns.   You must understand that you have received an Urgent Care treatment only and that you may be released before all of your medical problems are known or treated. You, the patient, will arrange for follow up care as instructed.     RED FLAGS/WARNING SYMPTOMS DISCUSSED WITH PATIENT THAT WOULD WARRANT EMERGENT MEDICAL ATTENTION. PATIENT VERBALIZED UNDERSTANDING.     Elevated Blood Pressure  Your blood pressure was elevated during your visit to the urgent care.  It was not so high that immediate care was needed but it is recommended that you monitor your blood pressure over the next week or two to make sure that it is not staying elevated.  Please have  your blood pressure taken 2-3 times daily at different times of the day.  Write all of those blood pressures down and record the time that they were taken.  Keep all that information and take it with you to see your Primary Care Physician.  If your blood pressure is consistently above 140/90 you will need to follow up with your PCP more quickly    You were given a steroid injection today. Risks and benefits were discussed with you.   If you have diabetes this injection will raise your blood sugar. This will normalize over the next 2-3 days. Please make sure you monitor you blood sugar accordingly.   This medication can also increase you blood pressure. Please monitor your blood pressure as discussed.   Please keep in mind that you should not take long term steroids unless prescribed by your physician. You should not exceed 4 steroid injections in a year and if you have multiple injections they should be spaced out adequately. Long term side effects and risks can occur as mentioned at todays visit.

## 2019-05-30 ENCOUNTER — OFFICE VISIT (OUTPATIENT)
Dept: ORTHOPEDICS | Facility: CLINIC | Age: 79
End: 2019-05-30
Payer: MEDICARE

## 2019-05-30 VITALS — BODY MASS INDEX: 20.2 KG/M2 | HEIGHT: 61 IN | WEIGHT: 107 LBS

## 2019-05-30 DIAGNOSIS — M48.062 SPINAL STENOSIS OF LUMBAR REGION WITH NEUROGENIC CLAUDICATION: Primary | ICD-10-CM

## 2019-05-30 PROCEDURE — 1101F PR PT FALLS ASSESS DOC 0-1 FALLS W/OUT INJ PAST YR: ICD-10-PCS | Mod: CPTII,S$GLB,, | Performed by: ORTHOPAEDIC SURGERY

## 2019-05-30 PROCEDURE — 99202 OFFICE O/P NEW SF 15 MIN: CPT | Mod: S$GLB,,, | Performed by: ORTHOPAEDIC SURGERY

## 2019-05-30 PROCEDURE — 1101F PT FALLS ASSESS-DOCD LE1/YR: CPT | Mod: CPTII,S$GLB,, | Performed by: ORTHOPAEDIC SURGERY

## 2019-05-30 PROCEDURE — 99999 PR PBB SHADOW E&M-EST. PATIENT-LVL III: CPT | Mod: PBBFAC,,, | Performed by: ORTHOPAEDIC SURGERY

## 2019-05-30 PROCEDURE — 99999 PR PBB SHADOW E&M-EST. PATIENT-LVL III: ICD-10-PCS | Mod: PBBFAC,,, | Performed by: ORTHOPAEDIC SURGERY

## 2019-05-30 PROCEDURE — 99202 PR OFFICE/OUTPT VISIT, NEW, LEVL II, 15-29 MIN: ICD-10-PCS | Mod: S$GLB,,, | Performed by: ORTHOPAEDIC SURGERY

## 2019-05-30 RX ORDER — HYDROXYUREA 500 MG/1
500 CAPSULE ORAL DAILY
COMMUNITY

## 2019-05-30 RX ORDER — AMITRIPTYLINE HYDROCHLORIDE 10 MG/1
10 TABLET, FILM COATED ORAL NIGHTLY PRN
COMMUNITY

## 2019-05-30 NOTE — PROGRESS NOTES
Subjective:      Patient ID: Shira De Leon is a 79 y.o. female.    Chief Complaint: Pain of the Right Femur and Pain of the Left Femur    HPI      Shira De Leon is seen for evaluation and treatment of hip pain.  They have experienced problems with their bilateral hip over the past Many years Pain is located   The patient's pain has a burning nature and is not related to activity.. They have been treated with NA.   Symptoms have recently stayed the same. Ambulation reportedly has not been impaired. Self care ADLs are not painful.     Review of Systems   Constitution: Negative for fever and weight loss.   HENT: Negative for congestion.    Eyes: Negative for visual disturbance.   Cardiovascular: Negative for chest pain.   Respiratory: Negative for shortness of breath.    Hematologic/Lymphatic: Negative for bleeding problem. Does not bruise/bleed easily.   Skin: Negative for poor wound healing.   Musculoskeletal: Positive for back pain and joint pain.   Gastrointestinal: Negative for abdominal pain.   Genitourinary: Negative for dysuria.   Neurological: Negative for seizures.   Psychiatric/Behavioral: Negative for altered mental status.   Allergic/Immunologic: Negative for persistent infections.         Objective:            Ortho/SPM Exam    Right hip    The patient is not in acute distress.   Body habitus is:normal.   Sclerae normal  The patient walks without a limp.   Respiratory distress:  none  The skin over the hip is:intact.   There is no local tenderness.   Range of motion- Flexion full, External rotation full, internal rotation full.  Resisted SLR negative.  Pain with rotation negative  Sciatic tension findings negative.  Shortening/lengthening compared to the contralateral side absent.  Pulses DP present, PT present.  Motor normal 5/5 strength in all tested muscle groups.   Sensory normal.    The left hip also has painless motion without tension findings despite history of prior left lower extremity  fractures                  Assessment:       Encounter Diagnosis   Name Primary?    Spinal stenosis of lumbar region with neurogenic claudication Yes          Plan:       Shira was seen today for pain and pain.    Diagnoses and all orders for this visit:    Spinal stenosis of lumbar region with neurogenic claudication      I explained my diagnostic impression and the reasoning behind it in detail, using layman's terms.  Models and/or pictures were used to help in the explanation.    Repeat MRI    Pain clinic referral    If this does not control symptoms neurosurgical consultation would be recommended

## 2020-02-19 ENCOUNTER — OFFICE VISIT (OUTPATIENT)
Dept: URGENT CARE | Facility: CLINIC | Age: 80
End: 2020-02-19
Payer: MEDICARE

## 2020-02-19 VITALS
HEART RATE: 74 BPM | OXYGEN SATURATION: 98 % | BODY MASS INDEX: 21.9 KG/M2 | SYSTOLIC BLOOD PRESSURE: 176 MMHG | HEIGHT: 61 IN | WEIGHT: 116 LBS | DIASTOLIC BLOOD PRESSURE: 75 MMHG | TEMPERATURE: 97 F | RESPIRATION RATE: 18 BRPM

## 2020-02-19 DIAGNOSIS — N30.00 ACUTE CYSTITIS WITHOUT HEMATURIA: Primary | ICD-10-CM

## 2020-02-19 LAB
BILIRUB UR QL STRIP: POSITIVE
GLUCOSE UR QL STRIP: NEGATIVE
KETONES UR QL STRIP: NEGATIVE
LEUKOCYTE ESTERASE UR QL STRIP: POSITIVE
PH, POC UA: 5.5 (ref 5–8)
POC BLOOD, URINE: POSITIVE
POC NITRATES, URINE: POSITIVE
PROT UR QL STRIP: POSITIVE
SP GR UR STRIP: 1.02 (ref 1–1.03)
UROBILINOGEN UR STRIP-ACNC: 1 (ref 0.1–1.1)

## 2020-02-19 PROCEDURE — 99213 OFFICE O/P EST LOW 20 MIN: CPT | Mod: 25,S$GLB,, | Performed by: PHYSICIAN ASSISTANT

## 2020-02-19 PROCEDURE — 99213 PR OFFICE/OUTPT VISIT, EST, LEVL III, 20-29 MIN: ICD-10-PCS | Mod: 25,S$GLB,, | Performed by: PHYSICIAN ASSISTANT

## 2020-02-19 PROCEDURE — 81003 POCT URINALYSIS, DIPSTICK, AUTOMATED, W/O SCOPE: ICD-10-PCS | Mod: QW,S$GLB,, | Performed by: PHYSICIAN ASSISTANT

## 2020-02-19 PROCEDURE — 87086 URINE CULTURE/COLONY COUNT: CPT

## 2020-02-19 PROCEDURE — 87077 CULTURE AEROBIC IDENTIFY: CPT

## 2020-02-19 PROCEDURE — 81003 URINALYSIS AUTO W/O SCOPE: CPT | Mod: QW,S$GLB,, | Performed by: PHYSICIAN ASSISTANT

## 2020-02-19 PROCEDURE — 87088 URINE BACTERIA CULTURE: CPT

## 2020-02-19 PROCEDURE — 87186 SC STD MICRODIL/AGAR DIL: CPT

## 2020-02-19 RX ORDER — NITROFURANTOIN (MACROCRYSTALS) 100 MG/1
100 CAPSULE ORAL 2 TIMES DAILY
Qty: 14 CAPSULE | Refills: 0 | Status: SHIPPED | OUTPATIENT
Start: 2020-02-19 | End: 2020-02-26

## 2020-02-19 RX ORDER — ATORVASTATIN CALCIUM 20 MG/1
20 TABLET, FILM COATED ORAL DAILY
COMMUNITY

## 2020-02-19 RX ORDER — LOSARTAN POTASSIUM 100 MG/1
100 TABLET ORAL DAILY
COMMUNITY

## 2020-02-19 NOTE — PROGRESS NOTES
"Subjective:       Patient ID: Shira De Leon is a 80 y.o. female.    Vitals:  height is 5' 1" (1.549 m) and weight is 52.6 kg (116 lb). Her temperature is 96.5 °F (35.8 °C). Her blood pressure is 176/75 (abnormal) and her pulse is 74. Her respiration is 18 and oxygen saturation is 98%.     Chief Complaint: Urinary Tract Infection    Ms. De Leon presents for evaluation of dysuria, frequency seen for the past 3 days.  She denies any fevers, chills, flank.  She has been taking AZO for her symptoms with some relief.    Urinary Tract Infection    This is a new problem. The current episode started in the past 7 days (3 Days Ago ). The problem has been gradually improving. The quality of the pain is described as burning. The pain is mild. There has been no fever. There is no history of pyelonephritis. Associated symptoms include frequency and urgency. Pertinent negatives include no chills, flank pain, hematuria, nausea, vomiting or rash. Treatments tried: AZO  The treatment provided moderate relief.       Constitution: Negative for chills and fever.   Neck: Negative for painful lymph nodes.   Gastrointestinal: Negative for abdominal pain, nausea and vomiting.   Genitourinary: Positive for dysuria, frequency and urgency. Negative for urine decreased, flank pain, hematuria, history of kidney stones, painful menstruation, irregular menstruation, missed menses, heavy menstrual bleeding, ovarian cysts, genital trauma, vaginal pain, vaginal discharge, vaginal bleeding, vaginal odor, painful intercourse, genital sore, painful ejaculation and pelvic pain.   Musculoskeletal: Positive for back pain.   Skin: Negative for rash and lesion.   Hematologic/Lymphatic: Negative for swollen lymph nodes.       Objective:      Physical Exam   Constitutional: She is oriented to person, place, and time. She appears well-developed and well-nourished.   HENT:   Head: Normocephalic and atraumatic.   Right Ear: External ear normal.   Left Ear: External " ear normal.   Nose: Nose normal. No nasal deformity. No epistaxis.   Mouth/Throat: Oropharynx is clear and moist and mucous membranes are normal.   Eyes: Lids are normal.   Neck: Trachea normal, normal range of motion and phonation normal. Neck supple.   Cardiovascular: Normal rate, regular rhythm, normal heart sounds and normal pulses.   Pulmonary/Chest: Effort normal and breath sounds normal. No stridor. She has no decreased breath sounds. She has no wheezes. She has no rhonchi. She has no rales.   Abdominal: Soft. Normal appearance and bowel sounds are normal. She exhibits no distension. There is no tenderness. There is no rigidity, no rebound, no guarding, no CVA tenderness, no tenderness at McBurney's point and negative Abraham's sign.   Neurological: She is alert and oriented to person, place, and time.   Skin: Skin is warm, dry and intact.   Psychiatric: She has a normal mood and affect. Her speech is normal and behavior is normal. Cognition and memory are normal.   Nursing note and vitals reviewed.        Results for orders placed or performed in visit on 02/19/20   POCT Urinalysis, Dipstick, Automated, W/O Scope   Result Value Ref Range    POC Blood, Urine Positive (A) Negative    POC Bilirubin, Urine Positive (A) Negative    POC Urobilinogen, Urine 1.0 0.1 - 1.1    POC Ketones, Urine Negative Negative    POC Protein, Urine Positive (A) Negative    POC Nitrates, Urine Positive (A) Negative    POC Glucose, Urine Negative Negative    pH, UA 5.5 5 - 8    POC Specific Gravity, Urine 1.020 1.003 - 1.029    POC Leukocytes, Urine Positive (A) Negative       Assessment:       1. Acute cystitis without hematuria        Plan:         Acute cystitis without hematuria  -     Urine culture  -     POCT Urinalysis, Dipstick, Automated, W/O Scope    Other orders  -     nitrofurantoin (MACRODANTIN) 100 MG capsule; Take 1 capsule (100 mg total) by mouth 2 (two) times daily. for 7 days  Dispense: 14 capsule; Refill:  0      Patient Instructions   PLEASE READ YOUR DISCHARGE INSTRUCTIONS ENTIRELY AS IT CONTAINS IMPORTANT INFORMATION.  A culture was sent to the lab today.  We will call to discuss your results in 3-5 days.  - Rest.    - Drink plenty of fluids.    - Tylenol or Ibuprofen as directed as needed for fever/pain.    - If you were prescribed antibiotics, please take them to completion.  - If you are female and on birth control pills - please use additional methods of contraception to prevent pregnancy while on antibiotics and for one cycle after.   - If you were prescribed a narcotic medication or muscle relaxer, do not drive or operate heavy equipment or machinery while taking these medications, as they can cause drowsiness.   - If you smoke, please stop smoking.  -You must understand that you've received an Urgent Care treatment only and that you may be released before all your medical problems are known or treated. You, the patient, will    arrange for follow up care as instructed. Please arrange follow up with your primary medical clinic as soon as possible.   - Follow up with your PCP or specialty clinic as directed in the next 1-2 weeks if not improved or as needed.  You can call [phone] to schedule an appointment with the appropriate provider.    - Please return to Urgent Care or to the Emergency Department if your symptoms worsen.    Patient aware and verbalized understanding.    Bladder Infection, Female (Adult)    Urine is normally doesn't have any bacteria in it. But bacteria can get into the urinary tract from the skin around the rectum. Or they can travel in the blood from elsewhere in the body. Once they are in your urinary tract, they can cause infection in the urethra (urethritis), the bladder (cystitis), or the kidneys (pyelonephritis).  The most common place for an infection is in the bladder. This is called a bladder infection. This is one of the most common infections in women. Most bladder  "infections are easily treated. They are not serious unless the infection spreads to the kidney.  The phrases "bladder infection," "UTI," and "cystitis" are often used to describe the same thing. But they are not always the same. Cystitis is an inflammation of the bladder. The most common cause of cystitis is an infection.  Symptoms  The infection causes inflammation in the urethra and bladder. This causes many of the symptoms. The most common symptoms of a bladder infection are:  · Pain or burning when urinating  · Having to urinate more often than usual  · Urgent need to urinate  · Only a small amount of urine comes out  · Blood in urine  · Abdominal discomfort. This is usually in the lower abdomen above the pubic bone.  · Cloudy urine  · Strong- or bad-smelling urine  · Unable to urinate (urinary retention)  · Unable to hold urine in (urinary incontinence)  · Fever  · Loss of appetite  · Confusion (in older adults)  Causes  Bladder infections are not contagious. You can't get one from someone else, from a toilet seat, or from sharing a bath.  The most common cause of bladder infections is bacteria from the bowels. The bacteria get onto the skin around the opening of the urethra. From there, they can get into the urine and travel up to the bladder, causing inflammation and infection. This usually happens because of:  · Wiping improperly after urinating. Always wipe from front to back.  · Bowel incontinence  · Pregnancy  · Procedures such as having a catheter inserted  · Older age  · Not emptying your bladder. This can allow bacteria a chance to grow in your urine.  · Dehydration  · Constipation  · Sex  · Use of a diaphragm for birth control   Treatment  Bladder infections are diagnosed by a urine test. They are treated with antibiotics and usually clear up quickly without complications. Treatment helps prevent a more serious kidney infection.  Medicines  Medicines can help in the treatment of a bladder " infection:  · Take antibiotics until they are used up, even if you feel better. It is important to finish them to make sure the infection has cleared.  · You can use acetaminophen or ibuprofen for pain, fever, or discomfort, unless another medicine was prescribed. If you have chronic liver or kidney disease, talk with your healthcare provider before using these medicines. Also talk with your provider if you've ever had a stomach ulcer or gastrointestinal bleeding, or are taking blood-thinner medicines.  · If you are given phenazopydridine to reduce burning with urination, it will cause your urine to become a bright orange color. This can stain clothing.  Care and prevention  These self-care steps can help prevent future infections:  · Drink plenty of fluids to prevent dehydration and flush out your bladder. Do this unless you must restrict fluids for other health reasons, or your doctor told you not to.  · Proper cleaning after going to the bathroom is important. Wipe from front to back after using the toilet to prevent the spread of bacteria.  · Urinate more often. Don't try to hold urine in for a long time.  · Wear loose-fitting clothes and cotton underwear. Avoid tight-fitting pants.  · Improve your diet and prevent constipation. Eat more fresh fruit and vegetables, and fiber, and less junk and fatty foods.  · Avoid sex until your symptoms are gone.  · Avoid caffeine, alcohol, and spicy foods. These can irritate your bladder.  · Urinate right after intercourse to flush out your bladder.  · If you use birth control pills and have frequent bladder infections, discuss it with your doctor.  Follow-up care  Call your healthcare provider if all symptoms are not gone after 3 days of treatment. This is especially important if you have repeat infections.  If a culture was done, you will be told if your treatment needs to be changed. If directed, you can call to find out the results.  If X-rays were done, you will be told  if the results will affect your treatment.  Call 911  Call 911 if any of the following occur:  · Trouble breathing  · Hard to wake up or confusion  · Fainting or loss of consciousness  · Rapid heart rate  When to seek medical advice  Call your healthcare provider right away if any of these occur:  · Fever of 100.4ºF (38.0ºC) or higher, or as directed by your healthcare provider  · Symptoms are not better by the third day of treatment  · Back or belly (abdominal) pain that gets worse  · Repeated vomiting, or unable to keep medicine down  · Weakness or dizziness  · Vaginal discharge  · Pain, redness, or swelling in the outer vaginal area (labia)  Date Last Reviewed: 10/1/2016  © 2320-6488 [employer]. [address] [place] [address], [place] [address]. All rights reserved. This information is not intended as a substitute for professional medical care. Always follow your healthcare professional's instructions.

## 2020-02-19 NOTE — PATIENT INSTRUCTIONS
PLEASE READ YOUR DISCHARGE INSTRUCTIONS ENTIRELY AS IT CONTAINS IMPORTANT INFORMATION.  A culture was sent to the lab today.  We will call to discuss your results in 3-5 days.  - Rest.    - Drink plenty of fluids.    - Tylenol or Ibuprofen as directed as needed for fever/pain.    - If you were prescribed antibiotics, please take them to completion.  - If you are female and on birth control pills - please use additional methods of contraception to prevent pregnancy while on antibiotics and for one cycle after.   - If you were prescribed a narcotic medication or muscle relaxer, do not drive or operate heavy equipment or machinery while taking these medications, as they can cause drowsiness.   - If you smoke, please stop smoking.  -You must understand that you've received an Urgent Care treatment only and that you may be released before all your medical problems are known or treated. You, the patient, will    arrange for follow up care as instructed. Please arrange follow up with your primary medical clinic as soon as possible.   - Follow up with your PCP or specialty clinic as directed in the next 1-2 weeks if not improved or as needed.  You can call [phone] to schedule an appointment with the appropriate provider.    - Please return to Urgent Care or to the Emergency Department if your symptoms worsen.    Patient aware and verbalized understanding.    Bladder Infection, Female (Adult)    Urine is normally doesn't have any bacteria in it. But bacteria can get into the urinary tract from the skin around the rectum. Or they can travel in the blood from elsewhere in the body. Once they are in your urinary tract, they can cause infection in the urethra (urethritis), the bladder (cystitis), or the kidneys (pyelonephritis).  The most common place for an infection is in the bladder. This is called a bladder infection. This is one of the most common infections in women. Most bladder infections are easily treated. They  "are not serious unless the infection spreads to the kidney.  The phrases "bladder infection," "UTI," and "cystitis" are often used to describe the same thing. But they are not always the same. Cystitis is an inflammation of the bladder. The most common cause of cystitis is an infection.  Symptoms  The infection causes inflammation in the urethra and bladder. This causes many of the symptoms. The most common symptoms of a bladder infection are:  · Pain or burning when urinating  · Having to urinate more often than usual  · Urgent need to urinate  · Only a small amount of urine comes out  · Blood in urine  · Abdominal discomfort. This is usually in the lower abdomen above the pubic bone.  · Cloudy urine  · Strong- or bad-smelling urine  · Unable to urinate (urinary retention)  · Unable to hold urine in (urinary incontinence)  · Fever  · Loss of appetite  · Confusion (in older adults)  Causes  Bladder infections are not contagious. You can't get one from someone else, from a toilet seat, or from sharing a bath.  The most common cause of bladder infections is bacteria from the bowels. The bacteria get onto the skin around the opening of the urethra. From there, they can get into the urine and travel up to the bladder, causing inflammation and infection. This usually happens because of:  · Wiping improperly after urinating. Always wipe from front to back.  · Bowel incontinence  · Pregnancy  · Procedures such as having a catheter inserted  · Older age  · Not emptying your bladder. This can allow bacteria a chance to grow in your urine.  · Dehydration  · Constipation  · Sex  · Use of a diaphragm for birth control   Treatment  Bladder infections are diagnosed by a urine test. They are treated with antibiotics and usually clear up quickly without complications. Treatment helps prevent a more serious kidney infection.  Medicines  Medicines can help in the treatment of a bladder infection:  · Take antibiotics until they are " used up, even if you feel better. It is important to finish them to make sure the infection has cleared.  · You can use acetaminophen or ibuprofen for pain, fever, or discomfort, unless another medicine was prescribed. If you have chronic liver or kidney disease, talk with your healthcare provider before using these medicines. Also talk with your provider if you've ever had a stomach ulcer or gastrointestinal bleeding, or are taking blood-thinner medicines.  · If you are given phenazopydridine to reduce burning with urination, it will cause your urine to become a bright orange color. This can stain clothing.  Care and prevention  These self-care steps can help prevent future infections:  · Drink plenty of fluids to prevent dehydration and flush out your bladder. Do this unless you must restrict fluids for other health reasons, or your doctor told you not to.  · Proper cleaning after going to the bathroom is important. Wipe from front to back after using the toilet to prevent the spread of bacteria.  · Urinate more often. Don't try to hold urine in for a long time.  · Wear loose-fitting clothes and cotton underwear. Avoid tight-fitting pants.  · Improve your diet and prevent constipation. Eat more fresh fruit and vegetables, and fiber, and less junk and fatty foods.  · Avoid sex until your symptoms are gone.  · Avoid caffeine, alcohol, and spicy foods. These can irritate your bladder.  · Urinate right after intercourse to flush out your bladder.  · If you use birth control pills and have frequent bladder infections, discuss it with your doctor.  Follow-up care  Call your healthcare provider if all symptoms are not gone after 3 days of treatment. This is especially important if you have repeat infections.  If a culture was done, you will be told if your treatment needs to be changed. If directed, you can call to find out the results.  If X-rays were done, you will be told if the results will affect  your treatment.  Call 911  Call 911 if any of the following occur:  · Trouble breathing  · Hard to wake up or confusion  · Fainting or loss of consciousness  · Rapid heart rate  When to seek medical advice  Call your healthcare provider right away if any of these occur:  · Fever of 100.4ºF (38.0ºC) or higher, or as directed by your healthcare provider  · Symptoms are not better by the third day of treatment  · Back or belly (abdominal) pain that gets worse  · Repeated vomiting, or unable to keep medicine down  · Weakness or dizziness  · Vaginal discharge  · Pain, redness, or swelling in the outer vaginal area (labia)  Date Last Reviewed: 10/1/2016  © 9443-5114 [employer]. [address] [place] [address], [place] [address]. All rights reserved. This information is not intended as a substitute for professional medical care. Always follow your healthcare professional's instructions.

## 2020-02-22 LAB — BACTERIA UR CULT: ABNORMAL

## 2020-02-26 ENCOUNTER — OFFICE VISIT (OUTPATIENT)
Dept: OPTOMETRY | Facility: CLINIC | Age: 80
End: 2020-02-26
Payer: COMMERCIAL

## 2020-02-26 DIAGNOSIS — Z86.69 H/O BELL'S PALSY: ICD-10-CM

## 2020-02-26 DIAGNOSIS — D49.7 PITUITARY TUMOR: ICD-10-CM

## 2020-02-26 DIAGNOSIS — H04.123 DRY EYES: ICD-10-CM

## 2020-02-26 DIAGNOSIS — Z96.1 PSEUDOPHAKIA OF BOTH EYES: ICD-10-CM

## 2020-02-26 DIAGNOSIS — H18.453 SALZMANN'S NODULAR DYSTROPHY OF BOTH EYES: Primary | ICD-10-CM

## 2020-02-26 PROCEDURE — 99999 PR PBB SHADOW E&M-EST. PATIENT-LVL II: CPT | Mod: PBBFAC,,, | Performed by: OPTOMETRIST

## 2020-02-26 PROCEDURE — 92014 COMPRE OPH EXAM EST PT 1/>: CPT | Mod: S$GLB,,, | Performed by: OPTOMETRIST

## 2020-02-26 PROCEDURE — 92014 PR EYE EXAM, EST PATIENT,COMPREHESV: ICD-10-PCS | Mod: S$GLB,,, | Performed by: OPTOMETRIST

## 2020-02-26 PROCEDURE — 99999 PR PBB SHADOW E&M-EST. PATIENT-LVL II: ICD-10-PCS | Mod: PBBFAC,,, | Performed by: OPTOMETRIST

## 2020-02-26 NOTE — PROGRESS NOTES
HPI     Using ointment at night for dry eyes  No drops during day  Happy with glasses    Last edited by Russ Hernandez, OD on 2/26/2020  4:13 PM. (History)            Assessment /Plan     For exam results, see Encounter Report.    Salzmann's nodular dystrophy of both eyes    Dry eyes - Both Eyes    Pseudophakia of both eyes    H/O Bell's palsy    Pituitary tumor      1,2. Increase lubricant drops during the day 3-4x/day with refresh liquigel rtc 2 weeks recjeck cornea and vision.  3. Monitor condition. Patient to report any changes. RTC 1 year recheck.  4. Monitor condition. Patient to report any changes. RTC 1 year recheck.  5. Last seen 2018, due for follow up. No p[eripheral vision complaints.

## 2020-03-05 ENCOUNTER — LAB VISIT (OUTPATIENT)
Dept: LAB | Facility: HOSPITAL | Age: 80
End: 2020-03-05
Attending: FAMILY MEDICINE
Payer: MEDICARE

## 2020-03-05 DIAGNOSIS — M81.0 SENILE OSTEOPOROSIS: Primary | ICD-10-CM

## 2020-03-05 PROCEDURE — 82310 ASSAY OF CALCIUM: CPT

## 2020-03-05 PROCEDURE — 36415 COLL VENOUS BLD VENIPUNCTURE: CPT | Mod: PO

## 2020-03-05 PROCEDURE — 82306 VITAMIN D 25 HYDROXY: CPT

## 2020-03-06 LAB
25(OH)D3+25(OH)D2 SERPL-MCNC: 35 NG/ML (ref 30–96)
CALCIUM SERPL-MCNC: 9.6 MG/DL (ref 8.7–10.5)

## 2020-10-08 ENCOUNTER — LAB VISIT (OUTPATIENT)
Dept: LAB | Facility: HOSPITAL | Age: 80
End: 2020-10-08
Attending: OBSTETRICS & GYNECOLOGY
Payer: MEDICARE

## 2020-10-08 DIAGNOSIS — M81.0 SENILE OSTEOPOROSIS: Primary | ICD-10-CM

## 2020-10-08 LAB
25(OH)D3+25(OH)D2 SERPL-MCNC: 33 NG/ML (ref 30–96)
CALCIUM SERPL-MCNC: 9.2 MG/DL (ref 8.7–10.5)

## 2020-10-08 PROCEDURE — 82306 VITAMIN D 25 HYDROXY: CPT

## 2020-10-08 PROCEDURE — 36415 COLL VENOUS BLD VENIPUNCTURE: CPT | Mod: PO

## 2020-10-08 PROCEDURE — 82310 ASSAY OF CALCIUM: CPT

## 2021-01-22 ENCOUNTER — OFFICE VISIT (OUTPATIENT)
Dept: URGENT CARE | Facility: CLINIC | Age: 81
End: 2021-01-22
Payer: MEDICARE

## 2021-01-22 VITALS
OXYGEN SATURATION: 97 % | DIASTOLIC BLOOD PRESSURE: 82 MMHG | RESPIRATION RATE: 20 BRPM | SYSTOLIC BLOOD PRESSURE: 178 MMHG | WEIGHT: 116 LBS | TEMPERATURE: 99 F | HEIGHT: 61 IN | HEART RATE: 70 BPM | BODY MASS INDEX: 21.9 KG/M2

## 2021-01-22 DIAGNOSIS — B34.9 VIRAL SYNDROME: Primary | ICD-10-CM

## 2021-01-22 DIAGNOSIS — R09.89 ABNORMAL LUNG SOUNDS: ICD-10-CM

## 2021-01-22 LAB
CTP QC/QA: YES
SARS-COV-2 RDRP RESP QL NAA+PROBE: NEGATIVE

## 2021-01-22 PROCEDURE — 71046 XR CHEST PA AND LATERAL: ICD-10-PCS | Mod: FY,S$GLB,, | Performed by: RADIOLOGY

## 2021-01-22 PROCEDURE — 99213 PR OFFICE/OUTPT VISIT, EST, LEVL III, 20-29 MIN: ICD-10-PCS | Mod: S$GLB,,, | Performed by: PHYSICIAN ASSISTANT

## 2021-01-22 PROCEDURE — 1157F ADVNC CARE PLAN IN RCRD: CPT | Mod: S$GLB,,, | Performed by: PHYSICIAN ASSISTANT

## 2021-01-22 PROCEDURE — U0002 COVID-19 LAB TEST NON-CDC: HCPCS | Mod: QW,S$GLB,, | Performed by: PHYSICIAN ASSISTANT

## 2021-01-22 PROCEDURE — 99213 OFFICE O/P EST LOW 20 MIN: CPT | Mod: S$GLB,,, | Performed by: PHYSICIAN ASSISTANT

## 2021-01-22 PROCEDURE — U0002: ICD-10-PCS | Mod: QW,S$GLB,, | Performed by: PHYSICIAN ASSISTANT

## 2021-01-22 PROCEDURE — 71046 X-RAY EXAM CHEST 2 VIEWS: CPT | Mod: FY,S$GLB,, | Performed by: RADIOLOGY

## 2021-01-22 PROCEDURE — 1157F PR ADVANCE CARE PLAN OR EQUIV PRESENT IN MEDICAL RECORD: ICD-10-PCS | Mod: S$GLB,,, | Performed by: PHYSICIAN ASSISTANT

## 2021-07-15 ENCOUNTER — TELEPHONE (OUTPATIENT)
Dept: OPTOMETRY | Facility: CLINIC | Age: 81
End: 2021-07-15

## 2021-07-15 ENCOUNTER — OFFICE VISIT (OUTPATIENT)
Dept: OPTOMETRY | Facility: CLINIC | Age: 81
End: 2021-07-15
Payer: COMMERCIAL

## 2021-07-15 DIAGNOSIS — G43.109 OCULAR MIGRAINE: ICD-10-CM

## 2021-07-15 DIAGNOSIS — H43.393 VISUAL FLOATERS, BILATERAL: ICD-10-CM

## 2021-07-15 DIAGNOSIS — D49.7 PITUITARY TUMOR: ICD-10-CM

## 2021-07-15 DIAGNOSIS — H18.453 SALZMANN'S NODULAR DYSTROPHY OF BOTH EYES: ICD-10-CM

## 2021-07-15 DIAGNOSIS — H04.123 DRY EYE SYNDROME OF BOTH EYES: ICD-10-CM

## 2021-07-15 DIAGNOSIS — H52.203 MYOPIA WITH ASTIGMATISM AND PRESBYOPIA, BILATERAL: Primary | ICD-10-CM

## 2021-07-15 DIAGNOSIS — Z86.69 H/O BELL'S PALSY: ICD-10-CM

## 2021-07-15 DIAGNOSIS — Z96.1 PSEUDOPHAKIA OF BOTH EYES: ICD-10-CM

## 2021-07-15 DIAGNOSIS — H52.13 MYOPIA WITH ASTIGMATISM AND PRESBYOPIA, BILATERAL: Primary | ICD-10-CM

## 2021-07-15 DIAGNOSIS — H52.4 MYOPIA WITH ASTIGMATISM AND PRESBYOPIA, BILATERAL: Primary | ICD-10-CM

## 2021-07-15 PROCEDURE — 92015 PR REFRACTION: ICD-10-PCS | Mod: S$GLB,,, | Performed by: OPTOMETRIST

## 2021-07-15 PROCEDURE — 99999 PR PBB SHADOW E&M-EST. PATIENT-LVL II: ICD-10-PCS | Mod: PBBFAC,,, | Performed by: OPTOMETRIST

## 2021-07-15 PROCEDURE — 92014 COMPRE OPH EXAM EST PT 1/>: CPT | Mod: S$GLB,,, | Performed by: OPTOMETRIST

## 2021-07-15 PROCEDURE — 92015 DETERMINE REFRACTIVE STATE: CPT | Mod: S$GLB,,, | Performed by: OPTOMETRIST

## 2021-07-15 PROCEDURE — 99999 PR PBB SHADOW E&M-EST. PATIENT-LVL II: CPT | Mod: PBBFAC,,, | Performed by: OPTOMETRIST

## 2021-07-15 PROCEDURE — 92014 PR EYE EXAM, EST PATIENT,COMPREHESV: ICD-10-PCS | Mod: S$GLB,,, | Performed by: OPTOMETRIST

## 2022-01-06 ENCOUNTER — TELEPHONE (OUTPATIENT)
Dept: UROLOGY | Facility: CLINIC | Age: 82
End: 2022-01-06
Payer: MEDICARE

## 2022-01-06 NOTE — TELEPHONE ENCOUNTER
Spoke to pt's daughter and scheduled mother for 2/16 at 8:30 am for recurrent uti and incontinence. Daughter verbalized she understood.

## 2022-01-06 NOTE — TELEPHONE ENCOUNTER
----- Message from Sarah Nelson sent at 1/6/2022 10:43 AM CST -----  Contact: daughter  Type:  Sooner Apoointment Request    Caller is requesting a sooner appointment.  Caller declined first available appointment listed below.  Caller will not accept being placed on the waitlist and is requesting a message be sent to doctor.    Name of Caller:  Terrie, daughter  When is the first available appointment?  3/30  Symptoms:  bladder issues  Best Call Back Number:  [phone]  Additional Information:  Terrie was told by Dr Bonds that he could fit her mother in next Wednesday, please advise

## 2022-02-16 ENCOUNTER — HOSPITAL ENCOUNTER (OUTPATIENT)
Dept: RADIOLOGY | Facility: HOSPITAL | Age: 82
Discharge: HOME | End: 2022-02-16
Attending: UROLOGY
Payer: MEDICARE

## 2022-02-16 ENCOUNTER — OFFICE VISIT (OUTPATIENT)
Dept: UROLOGY | Facility: CLINIC | Age: 82
End: 2022-02-16
Payer: MEDICARE

## 2022-02-16 VITALS — HEIGHT: 61 IN | WEIGHT: 116.88 LBS | BODY MASS INDEX: 22.07 KG/M2

## 2022-02-16 DIAGNOSIS — R32 INCONTINENCE IN FEMALE: ICD-10-CM

## 2022-02-16 DIAGNOSIS — R32 INCONTINENCE IN FEMALE: Primary | ICD-10-CM

## 2022-02-16 DIAGNOSIS — R31.29 MICROHEMATURIA: ICD-10-CM

## 2022-02-16 DIAGNOSIS — N39.0 RECURRENT UTI: ICD-10-CM

## 2022-02-16 DIAGNOSIS — R35.0 URINARY FREQUENCY: ICD-10-CM

## 2022-02-16 DIAGNOSIS — N39.41 URGE INCONTINENCE: ICD-10-CM

## 2022-02-16 DIAGNOSIS — N95.2 ATROPHIC VAGINITIS: ICD-10-CM

## 2022-02-16 LAB
BILIRUB SERPL-MCNC: ABNORMAL MG/DL
BLOOD URINE, POC: ABNORMAL
CLARITY, POC UA: CLEAR
COLOR, POC UA: YELLOW
GLUCOSE UR QL STRIP: ABNORMAL
KETONES UR QL STRIP: ABNORMAL
LEUKOCYTE ESTERASE URINE, POC: ABNORMAL
NITRITE, POC UA: ABNORMAL
PH, POC UA: 7
PROTEIN, POC: ABNORMAL
SPECIFIC GRAVITY, POC UA: 1.01
UROBILINOGEN, POC UA: 0.2

## 2022-02-16 PROCEDURE — 1160F PR REVIEW ALL MEDS BY PRESCRIBER/CLIN PHARMACIST DOCUMENTED: ICD-10-PCS | Mod: CPTII,S$GLB,, | Performed by: UROLOGY

## 2022-02-16 PROCEDURE — 99999 PR PBB SHADOW E&M-EST. PATIENT-LVL II: CPT | Mod: PBBFAC,,, | Performed by: UROLOGY

## 2022-02-16 PROCEDURE — 1157F ADVNC CARE PLAN IN RCRD: CPT | Mod: CPTII,S$GLB,, | Performed by: UROLOGY

## 2022-02-16 PROCEDURE — 88112 CYTOPATH CELL ENHANCE TECH: CPT | Performed by: PATHOLOGY

## 2022-02-16 PROCEDURE — 99999 PR PBB SHADOW E&M-EST. PATIENT-LVL II: ICD-10-PCS | Mod: PBBFAC,,, | Performed by: UROLOGY

## 2022-02-16 PROCEDURE — 81002 URINALYSIS NONAUTO W/O SCOPE: CPT | Mod: S$GLB,,, | Performed by: UROLOGY

## 2022-02-16 PROCEDURE — 88112 PR  CYTOPATH, CELL ENHANCE TECH: ICD-10-PCS | Mod: 26,,, | Performed by: PATHOLOGY

## 2022-02-16 PROCEDURE — 3288F FALL RISK ASSESSMENT DOCD: CPT | Mod: CPTII,S$GLB,, | Performed by: UROLOGY

## 2022-02-16 PROCEDURE — 1160F RVW MEDS BY RX/DR IN RCRD: CPT | Mod: CPTII,S$GLB,, | Performed by: UROLOGY

## 2022-02-16 PROCEDURE — 1126F PR PAIN SEVERITY QUANTIFIED, NO PAIN PRESENT: ICD-10-PCS | Mod: CPTII,S$GLB,, | Performed by: UROLOGY

## 2022-02-16 PROCEDURE — 1157F PR ADVANCE CARE PLAN OR EQUIV PRESENT IN MEDICAL RECORD: ICD-10-PCS | Mod: CPTII,S$GLB,, | Performed by: UROLOGY

## 2022-02-16 PROCEDURE — 76770 US EXAM ABDO BACK WALL COMP: CPT | Mod: 26,,, | Performed by: RADIOLOGY

## 2022-02-16 PROCEDURE — 1159F MED LIST DOCD IN RCRD: CPT | Mod: CPTII,S$GLB,, | Performed by: UROLOGY

## 2022-02-16 PROCEDURE — 76770 US KIDNEY: ICD-10-PCS | Mod: 26,,, | Performed by: RADIOLOGY

## 2022-02-16 PROCEDURE — 1100F PTFALLS ASSESS-DOCD GE2>/YR: CPT | Mod: CPTII,S$GLB,, | Performed by: UROLOGY

## 2022-02-16 PROCEDURE — 76770 US EXAM ABDO BACK WALL COMP: CPT | Mod: TC,PO

## 2022-02-16 PROCEDURE — 1100F PR PT FALLS ASSESS DOC 2+ FALLS/FALL W/INJURY/YR: ICD-10-PCS | Mod: CPTII,S$GLB,, | Performed by: UROLOGY

## 2022-02-16 PROCEDURE — 1159F PR MEDICATION LIST DOCUMENTED IN MEDICAL RECORD: ICD-10-PCS | Mod: CPTII,S$GLB,, | Performed by: UROLOGY

## 2022-02-16 PROCEDURE — 99204 PR OFFICE/OUTPT VISIT, NEW, LEVL IV, 45-59 MIN: ICD-10-PCS | Mod: S$GLB,,, | Performed by: UROLOGY

## 2022-02-16 PROCEDURE — 88112 CYTOPATH CELL ENHANCE TECH: CPT | Mod: 26,,, | Performed by: PATHOLOGY

## 2022-02-16 PROCEDURE — 81002 POCT URINE DIPSTICK WITHOUT MICROSCOPE: ICD-10-PCS | Mod: S$GLB,,, | Performed by: UROLOGY

## 2022-02-16 PROCEDURE — 3288F PR FALLS RISK ASSESSMENT DOCUMENTED: ICD-10-PCS | Mod: CPTII,S$GLB,, | Performed by: UROLOGY

## 2022-02-16 PROCEDURE — 99204 OFFICE O/P NEW MOD 45 MIN: CPT | Mod: S$GLB,,, | Performed by: UROLOGY

## 2022-02-16 PROCEDURE — 1126F AMNT PAIN NOTED NONE PRSNT: CPT | Mod: CPTII,S$GLB,, | Performed by: UROLOGY

## 2022-02-16 RX ORDER — METOPROLOL TARTRATE 100 MG/1
100 TABLET ORAL DAILY
COMMUNITY

## 2022-02-16 RX ORDER — OXYBUTYNIN CHLORIDE 10 MG/1
10 TABLET, EXTENDED RELEASE ORAL DAILY
Qty: 30 TABLET | Refills: 11 | Status: SHIPPED | OUTPATIENT
Start: 2022-02-16 | End: 2022-03-29

## 2022-02-16 RX ORDER — HYDROCHLOROTHIAZIDE 12.5 MG/1
CAPSULE ORAL
COMMUNITY
Start: 2021-09-16

## 2022-02-16 RX ORDER — ESTRADIOL 0.1 MG/G
1 CREAM VAGINAL
Qty: 42 G | Refills: 3 | Status: SHIPPED | OUTPATIENT
Start: 2022-02-16 | End: 2023-02-16

## 2022-02-16 NOTE — PROGRESS NOTES
UROLOGY [place]  2 16 22    Cc recurrent uti, urinary frequency    Age 82, comes accompanied by daughter Adrianne, who is an icu nurse at [employer]. Pt has recurrent uti's, mostly 4-6 per year. Her usual symptoms are dysuria, urgency, incontinence, urethral burning, malodorous urine. Usually is given antibiotics and does well, later on it recurs.     In addition to the tendency to frequent uti's, pt has a chronic problem with urinary frequency and urgency and urge incontinence. This gets her panties wet all the time. Nocturia is normally x 4-5. Cannot sleep well.     Also she has been told many times that she has microhematuria     PMH    Surgical:  has a past surgical history that includes Cataract extraction; blephroplasty; yag; bladder lift (2012); Total abdominal hysterectomy w/ bilateral salpingoophorectomy; pituitary adenoma resection (2013); Hysterectomy; Colonoscopy (N/A, 9/15/2016); and abdominal exploration (1985).    Medical:  has a past medical history of Arthritis, GERD (gastroesophageal reflux disease), HTN (hypertension), Myelopathy of thoracic region, Osteoporosis, unspecified, Pituitary adenoma, and PVD (peripheral vascular disease).    Familial: no fh of renal disease    Social: , lives in [place]    Meds:   Current Outpatient Medications on File Prior to Visit   Medication Sig Dispense Refill    amitriptyline (ELAVIL) 10 MG ta Take 10 mg by mnia.      atorvastatin (LIPITOR) 20 MG tablet Take 20 mg by mouth once daily.      hydroCHLOROthiazide (MICROZIDE) 12.5 mg capsule   See Instruction      hydroxyurea (HYDREA) 500 mg Cap Take 500 mg by mouth once daily.      losartan (COZAAR) 100 MG tablet Take 100 mg by mouth once daily.      metoprolol tartrate (LOPRESSOR) 100 MG tablet Take 100 mg by mouth Daily.       REVIEW OF SYSTEMS  GENERAL: No complaints of fatigue. No headaches or dizzy spells.   HEENT: vision preserved. Sinuses: No complaints.   CARDIOPULMONARY: No swelling of  the legs; no chest pain. No shortness of breath, no wheezing.   GASTROINTESTINAL: No heartburn. Denies diarrhea; denies constipation, no blood or mucus in stools.   GENITOURINARY: has recurrent uti. Has chronic urgency and incontinence.    MUSCULOSKELETAL: has arthritic complaints such as pain or stiffness.   PSYCHIATRIC: No history of depression or anxiety.   ENDOCRINOLOGIC: No reports of sweating, cold or heat intolerance. No polyuria or polydipsia.   NEUROLOGICAL: No dizziness, syncope, paralysis  LYMPHATICS: No enlarged nodes. No history of splenectomy.    Pt alert, oriented, no distress  HEENT: wnl.  Neck: supple, no JVD, no lymphadenopathy  Chest: CV NSR  Lungs: normal chest expansion, no labored breathing  Abdomen flat, nontender, no organomegaly, no masses.  External genitalia with marked atrophic vaginitis. Has visible urethral meatal mucosa. There is no cystocele, there is small rectocele. No pelvic masses. Uterus absent.   Extremities: no edema, peripheral pulses nl  Neuro: preserved    IMP  Urinary frequency, urgency and urge incontinence: overactive bladder  Recurrent uti  Microhematuria    I recommend urine cytology, upper tract imaging, bladder scan  Later on: cystoscopy  I am starting her on ditropan xl 10 mg po qd  Also starting estrace vaginal cream for atrophic vaginitis.  RTC 6 mo

## 2022-02-22 LAB
FINAL PATHOLOGIC DIAGNOSIS: NORMAL
Lab: NORMAL

## 2022-03-01 ENCOUNTER — TELEPHONE (OUTPATIENT)
Dept: UROLOGY | Facility: CLINIC | Age: 82
End: 2022-03-01
Payer: MEDICARE

## 2022-03-01 NOTE — TELEPHONE ENCOUNTER
I called and left a message in the answering machine: her urine cytology was normal, and the renal ultrasound was normal. This removes some of the anxiety that she had, but still if she wants to get a cystoscopy, that can still be arranged (for microhematuria and recurrent uti)

## 2022-03-07 ENCOUNTER — TELEPHONE (OUTPATIENT)
Dept: UROLOGY | Facility: CLINIC | Age: 82
End: 2022-03-07
Payer: MEDICARE

## 2022-03-07 DIAGNOSIS — N39.0 RECURRENT UTI: Primary | ICD-10-CM

## 2022-03-07 NOTE — TELEPHONE ENCOUNTER
Jodi, please go ahead and plan a cystoscopy for pt  (recurrent uti)  Thank you    Daughter is a nurse (mira logan [phone])

## 2022-03-29 ENCOUNTER — PROCEDURE VISIT (OUTPATIENT)
Dept: UROLOGY | Facility: CLINIC | Age: 82
End: 2022-03-29
Payer: MEDICARE

## 2022-03-29 VITALS — HEIGHT: 61 IN | WEIGHT: 114 LBS | BODY MASS INDEX: 21.52 KG/M2

## 2022-03-29 DIAGNOSIS — R33.9 INCOMPLETE BLADDER EMPTYING: ICD-10-CM

## 2022-03-29 DIAGNOSIS — N39.0 RECURRENT UTI: ICD-10-CM

## 2022-03-29 DIAGNOSIS — N32.81 OAB (OVERACTIVE BLADDER): Primary | ICD-10-CM

## 2022-03-29 LAB
BILIRUB SERPL-MCNC: NORMAL MG/DL
BLOOD URINE, POC: NORMAL
CLARITY, POC UA: NORMAL
COLOR, POC UA: YELLOW
GLUCOSE UR QL STRIP: NORMAL
KETONES UR QL STRIP: NORMAL
LEUKOCYTE ESTERASE URINE, POC: NORMAL
NITRITE, POC UA: NORMAL
PH, POC UA: 6
POC RESIDUAL URINE VOLUME: 122 ML (ref 0–100)
POC RESIDUAL URINE VOLUME: 278 ML (ref 0–100)
PROTEIN, POC: NORMAL
SPECIFIC GRAVITY, POC UA: 1.02
UROBILINOGEN, POC UA: 0.2

## 2022-03-29 PROCEDURE — 81002 POCT URINE DIPSTICK WITHOUT MICROSCOPE: ICD-10-PCS | Mod: S$GLB,,, | Performed by: UROLOGY

## 2022-03-29 PROCEDURE — 52000 PR CYSTOURETHROSCOPY: ICD-10-PCS | Mod: S$GLB,,, | Performed by: UROLOGY

## 2022-03-29 PROCEDURE — 52000 CYSTOURETHROSCOPY: CPT | Mod: S$GLB,,, | Performed by: UROLOGY

## 2022-03-29 PROCEDURE — 51798 US URINE CAPACITY MEASURE: CPT | Mod: S$GLB,,, | Performed by: UROLOGY

## 2022-03-29 PROCEDURE — 81002 URINALYSIS NONAUTO W/O SCOPE: CPT | Mod: S$GLB,,, | Performed by: UROLOGY

## 2022-03-29 PROCEDURE — 51798 POCT BLADDER SCAN: ICD-10-PCS | Mod: S$GLB,,, | Performed by: UROLOGY

## 2022-03-29 RX ORDER — CEFUROXIME AXETIL 250 MG/1
TABLET ORAL
COMMUNITY
Start: 2021-10-15 | End: 2022-03-29

## 2022-03-29 RX ORDER — CIPROFLOXACIN 500 MG/1
500 TABLET ORAL 2 TIMES DAILY
COMMUNITY
Start: 2021-10-18 | End: 2022-03-29

## 2022-03-29 RX ORDER — AMLODIPINE BESYLATE 5 MG/1
TABLET ORAL
COMMUNITY
Start: 2021-12-09

## 2022-03-29 NOTE — PROCEDURES
Cystoscopy    Date/Time: 3/29/2022 9:00 AM  Performed by: Husam Bonds MD  Authorized by: Husam Bonds MD       UROLOGY [place]  3 29 22     Cc recurrent uti, urinary frequency     Age 82, comes accompanied by daughter Adrianne, who is an icu nurse at [employer]. Pt has recurrent uti's, mostly 4-6 per year. Her usual symptoms are dysuria, urgency, incontinence, urethral burning, malodorous urine. Usually is given antibiotics and does well; later on, it recurs.      In addition to the tendency to frequent uti's, pt has a chronic problem with urinary frequency and urgency and urge incontinence. This gets her panties wet all the time. Nocturia is normally x 4-5. Cannot sleep well.      Also she has been told many times that she has microhematuria      Pt alert, oriented, no distress  Chest: normal chest expansion, no labored breathing  Abdomen flat, nontender, no organomegaly, no masses.  External genitalia with marked atrophic vaginitis. Has visible urethral meatal mucosa. There is no cystocele, there is small rectocele. No pelvic masses. Uterus absent.   Extremities: no edema, peripheral pulses nl  Neuro: preserved     CYSTOSCOPY olympus flexible. Urethra atrophic but no stricture or diverticulum. Bladder cavity distends symmetrically and equally when distended with water. Mucosal layer with no lesions, such as exophytic growths, ulcerations, or major erythematous areas. Mildly increased trabeculation, with occasional presence of a deeper sacculation, but no diverticula identified. Location and shape of the ureteral orifices normal. Pt tolerated the procedure well.      BLADDERSCAN ULTRASOUND  50  ml residual     URINE CYTOLOGY no malignant cells     EXAMINATION: US KIDNEY  CLINICAL HISTORY: Unspecified urinary incontinence  TECHNIQUE: Ultrasound of the kidneys was performed including color flow and Doppler evaluation of the kidneys.  COMPARISON: Ultrasound 12/02/2004, CT abdomen and pelvis  06/24/2017  FINDINGS: Right kidney: The right kidney measures 9.5 cm. No cortical thinning. No loss of corticomedullary distinction. Resistive index measures 0.74.  Simple-appearing 0.9 x 0.8 x 0.8 cm cyst in the lower pole.  No definite solid renal mass.  No renal stone. No hydronephrosis.  Left kidney: The left kidney measures 9.5 cm. No cortical thinning. No loss of corticomedullary distinction. Resistive index measures 0.76.  No mass. No renal stone. No hydronephrosis.  1. Mildly elevated renal artery resistive indices, which may reflect mild chronic medical renal disease.  2. Simple-appearing right renal cyst.     IMP  Urinary frequency, urgency and urge incontinence: overactive bladder. She has already taken ditropan xl 10 daily and did not help her. I am then giving her Myrbetriq 25 mg qd. There may be a denial because of high-cost drug, but we can explain that pt already tried ditropan and had side effects.     Recurrent uti. No anatomical findings were encountered that would explain her uti recurrence.   We are going to recommend pt continue on use of estrace vaginal cream applied with index finger, bean-size amount each time at time to go to bed, used 3 times a week. this will help with her atrophic vaginitis, and will help repopulate the vaginal space with Lactobacillus species.     Explained that asymptomatic bacteriuria should not be treated    I am putting her on long term low dose prophylaxis: macrodantin 50 mg daily    Microhematuria: benign, no further action needed     Nocturia: has to limit fluid intake in the evening.    Start taking cranberry extract daily and D-mannose 2 g daily

## 2022-04-01 RX ORDER — NITROFURANTOIN MACROCRYSTALS 50 MG/1
50 CAPSULE ORAL NIGHTLY
Qty: 30 CAPSULE | Refills: 11 | Status: SHIPPED | OUTPATIENT
Start: 2022-04-01 | End: 2023-04-01

## 2022-04-01 RX ORDER — NITROFURANTOIN 25; 75 MG/1; MG/1
100 CAPSULE ORAL 2 TIMES DAILY
Qty: 20 CAPSULE | Refills: 1 | Status: CANCELLED | OUTPATIENT
Start: 2022-04-01 | End: 2022-04-11

## 2022-04-13 DIAGNOSIS — N32.81 OAB (OVERACTIVE BLADDER): Primary | ICD-10-CM

## 2022-04-13 NOTE — TELEPHONE ENCOUNTER
----- Message from Ghislaine Couch sent at 4/13/2022  8:49 AM CDT -----  Contact: Mom  Type:  Needs Medical Advice    Who Called: Daughter     Would the patient rather a call back or a response via [facility]?  Call    Best Call Back Number:   [phone]     Additional Information:  Daughter needs to speak to the nurse about a medication Dr Bonds was calling in for patient before he retired     Please call to advise

## 2022-09-06 ENCOUNTER — TELEPHONE (OUTPATIENT)
Dept: OPTOMETRY | Facility: CLINIC | Age: 82
End: 2022-09-06
Payer: MEDICARE

## 2022-09-06 NOTE — TELEPHONE ENCOUNTER
----- Message from Coretta Salinas sent at 9/6/2022 11:56 AM CDT -----  Shu GAYLE Staff  Caller: Shira @[phone] (Today, 11:51 AM)  Pt needs an appt for floaters in right eye. Please give her a call back with an appt

## 2022-09-08 ENCOUNTER — TELEPHONE (OUTPATIENT)
Dept: OPTOMETRY | Facility: CLINIC | Age: 82
End: 2022-09-08
Payer: MEDICARE

## 2023-11-11 ENCOUNTER — OFFICE VISIT (OUTPATIENT)
Dept: URGENT CARE | Facility: CLINIC | Age: 83
End: 2023-11-11
Payer: MEDICARE

## 2023-11-11 VITALS
HEART RATE: 64 BPM | HEIGHT: 60 IN | RESPIRATION RATE: 17 BRPM | BODY MASS INDEX: 23.68 KG/M2 | OXYGEN SATURATION: 96 % | SYSTOLIC BLOOD PRESSURE: 193 MMHG | WEIGHT: 120.63 LBS | DIASTOLIC BLOOD PRESSURE: 89 MMHG | TEMPERATURE: 97 F
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
## 2023-11-11 PROCEDURE — 99213 OFFICE O/P EST LOW 20 MIN: CPT | Mod: S$GLB,,, | Performed by: PHYSICIAN ASSISTANT

## 2023-11-11 PROCEDURE — 99213 PR OFFICE/OUTPT VISIT, EST, LEVL III, 20-29 MIN: ICD-10-PCS | Mod: S$GLB,,, | Performed by: PHYSICIAN ASSISTANT

## 2023-11-11 RX ORDER — DOXYCYCLINE 100 MG/1
100 CAPSULE ORAL 2 TIMES DAILY
Qty: 14 CAPSULE | Refills: 0 | Status: SHIPPED | OUTPATIENT
Start: 2023-11-11 | End: 2023-11-18

## 2023-11-11 NOTE — PROGRESS NOTES
"Subjective:      Patient ID: Shira De Leon is a 83 y.o. female.    Vitals:  height is 4' 11.84" (1.52 m) and weight is 54.7 kg (120 lb 9.6 oz). Her oral temperature is 97.3 °F (36.3 °C). Her blood pressure is 193/89 (abnormal) and her pulse is 64. Her respiration is 17 and oxygen saturation is 96%.     Chief Complaint: Insect Bite    Pt. Presents with bug bite on face under right eye. States Wednesday she was sitting outside when a small yellow spider bit her. Treatments tried alcohol rubs, anti-itch cream with no relief. Denies any pain.    Insect Bite  This is a new problem. The current episode started in the past 7 days. The problem occurs constantly. The problem has been gradually worsening. Pertinent negatives include no chills, fever, nausea or vomiting.       Constitution: Negative for chills and fever.   Gastrointestinal:  Negative for nausea and vomiting.   Skin:  Positive for lesion and erythema.      Objective:     Physical Exam   Constitutional: She does not appear ill. No distress.   HENT:   Head: Normocephalic and atraumatic.   Ears:   Right Ear: External ear normal.   Left Ear: External ear normal.   Eyes: Conjunctivae are normal. Pupils are equal, round, and reactive to light. Right eye exhibits no discharge. Left eye exhibits no discharge. Extraocular movement intact   Cardiovascular: Normal rate and regular rhythm.   Pulmonary/Chest: Effort normal and breath sounds normal. She has no wheezes. She has no rhonchi. She has no rales.   Abdominal: Normal appearance.   Musculoskeletal: Normal range of motion.         General: Normal range of motion.   Neurological: no focal deficit. She is alert.   Skin: Skin is warm, dry and not pale. erythema         Comments: Irregular area of erythema over right cheekbone.  No purulent discharge.  No central area of necrosis.  Tender to palpation. jaundice  Psychiatric: Her behavior is normal. Mood, judgment and thought content normal.   Nursing note and vitals " reviewed.      Assessment:     1. Insect bite, unspecified site, initial encounter        Plan:       Insect bite, unspecified site, initial encounter    Other orders  -     doxycycline (VIBRAMYCIN) 100 MG Cap; Take 1 capsule (100 mg total) by mouth 2 (two) times daily. for 7 days  Dispense: 14 capsule; Refill: 0    Advised OTC cortisone cream.  Discussed no more than twice a day and no longer than 1 week.  Discussed do not get in the eyes.  May do cool compresses in between.  If symptoms do not improve please follow-up with Dermatology or PCP.  Patient voices understanding and agrees with plan.    Insect Bites and Stings Discharge Instructions   About this topic   Insect bites and stings can cause a reaction to your skin right away. When an insect bites you, it uses its mouth parts. When an insect stings you, it uses a special stinger on the back of its body. Some insects transfer blood from other people or animals they have bitten to you. This means you can get certain infections from insect bites. Insects that sting can inject you with a venom. The venom can irritate your skin. It can also be deadly for people who have a severe allergy. You may have:  Pain from the bite or sting  Itching  Burning  Numbness  Tingling  Redness  Swelling  In rare cases, some insect bites can lead to diseases such as malaria, plague, or Lyme disease.     What care is needed at home?   Ask your doctor what you need to do when you go home. Make sure you ask questions if you do not understand what the doctor says.  Keep the area clean and try not to scratch it.  Use cool compresses on the skin. They may help with swelling and itching. Dip a cloth in cold water and put it right on your itchy skin.  Use an over-the-counter cream or ointment to help with itching.  You may want to take drugs like ibuprofen, naproxen, or acetaminophen if the bite or sting is painful or very swollen.  What follow-up care is needed?   Your doctor may ask you  to make visits to the office to check on your progress. Be sure to keep these visits.  What drugs may be needed?   The doctor may order drugs to:  Help with pain and swelling  Relieve itching  Reduce your body's reaction to the bite or sting  What problems could happen?   Some people have a very bad allergy to insect bites and stings. If you have this kind of allergy, it can be life threatening. If you are stung, you might have:  Chest pain  Face or mouth swelling  Problems with swallowing  Trouble breathing  Shock  Be sure to carry an anti-allergy kit if you know you have very bad reactions to insect bites or stings.  What can be done to prevent this health problem?   Do not bother insects.  Wear bug spray on any skin that is showing when you go outside. Put bug spray on top of boots, bottom of pant legs, and sleeve cuffs.  Wear clothes that can protect your skin from any insect bites and stings.  Be careful when you eat outside because food attracts insects.  Put screens on windows.  Empty any standing water outside and wash containers with soap and water.  Use citronella candles outdoors to keep mosquitos away.  Treat your pets for fleas.  When do I need to call the doctor?   You have wheezing or trouble breathing.  You pass out or feel like you may pass out.  You have swelling of your face, lips, tongue, or throat.  You have stomach cramps, upset stomach, throw up, or loose stools.  You have a fever of 100.4°F (38°C) or higher or chills.  Your bite or sting is swollen, red, or warm.  Your bite or sting has thick, yellow, or green drainage.  Teach Back: Helping You Understand   The Teach Back Method helps you understand the information we are giving you. After you talk with the staff, tell them in your own words what you learned. This helps to make sure the staff has described each thing clearly. It also helps to explain things that may have been confusing. Before going home, make sure you can do these:  I can  tell you about my condition.  I can tell you what may help ease my pain.  I can tell you what I will do if I have redness, drainage, or warmth around my bite.  Where can I learn more?   American Academy of Family Physicians  https://familydoctor.org/bug-bites/   KidsHealth  http://kidshealth.org/parent/_summerspotlight/_parks/insect_bite.html   NHS Choices  https://www.nhs.uk/conditions/insect-bites-and-stings/   Last Reviewed Date   2021-10-06  Consumer Information Use and Disclaimer   This information is not specific medical advice and does not replace information you receive from your health care provider. This is only a brief summary of general information. It does NOT include all information about conditions, illnesses, injuries, tests, procedures, treatments, therapies, discharge instructions or life-style choices that may apply to you. You must talk with your health care provider for complete information about your health and treatment options. This information should not be used to decide whether or not to accept your health care providers advice, instructions or recommendations. Only your health care provider has the knowledge and training to provide advice that is right for you.  Copyright   Copyright © 2021 [employer] and its affiliates and/or licensors. All rights reserved.

## 2024-07-30 ENCOUNTER — TELEPHONE (OUTPATIENT)
Dept: UROLOGY | Facility: CLINIC | Age: 84
End: 2024-07-30
Payer: MEDICARE

## (undated) DEVICE — BANDAGE ADHESIVE